# Patient Record
Sex: MALE | Race: WHITE | NOT HISPANIC OR LATINO | Employment: FULL TIME | URBAN - METROPOLITAN AREA
[De-identification: names, ages, dates, MRNs, and addresses within clinical notes are randomized per-mention and may not be internally consistent; named-entity substitution may affect disease eponyms.]

---

## 2023-03-01 LAB
AMPHETAMINE (PRESENCE) IN URINE BY SCREEN METHOD: ABNORMAL
BARBITURATES PRESENCE IN URINE BY SCREEN METHOD: ABNORMAL
BENZODIAZEPINE (PRESENCE) IN URINE BY SCREEN METHOD: ABNORMAL
CANNABINOIDS IN URINE BY SCREEN METHOD: ABNORMAL
COCAINE (PRESENCE) IN URINE BY SCREEN METHOD: ABNORMAL
DRUG SCREEN COMMENT URINE: ABNORMAL
FENTANYL URINE: ABNORMAL
METHADONE (PRESENCE) IN URINE BY SCREEN METHOD: ABNORMAL
OPIATES (PRESENCE) IN URINE BY SCREEN METHOD: ABNORMAL
OXYCODONE (PRESENCE) IN URINE BY SCREEN METHOD: ABNORMAL
PHENCYCLIDINE (PRESENCE) IN URINE BY SCREEN METHOD: ABNORMAL

## 2023-03-05 LAB
AMPHETAMINES,URINE: >5000 NG/ML
MDA,URINE: <200 NG/ML
MDEA,URINE: <200 NG/ML
MDMA,UR: <200 NG/ML
METHAMPHETAMINE QUANTITATIVE URINE: <200 NG/ML
PHENTERMINE,UR: <200 NG/ML

## 2023-03-14 DIAGNOSIS — F90.1 ATTENTION DEFICIT HYPERACTIVITY DISORDER (ADHD), PREDOMINANTLY HYPERACTIVE TYPE: Primary | ICD-10-CM

## 2023-03-14 RX ORDER — DEXTROAMPHETAMINE SACCHARATE, AMPHETAMINE ASPARTATE MONOHYDRATE, DEXTROAMPHETAMINE SULFATE AND AMPHETAMINE SULFATE 7.5; 7.5; 7.5; 7.5 MG/1; MG/1; MG/1; MG/1
30 CAPSULE, EXTENDED RELEASE ORAL
COMMUNITY
Start: 2020-03-12 | End: 2023-03-14 | Stop reason: SDUPTHER

## 2023-03-14 RX ORDER — DEXTROAMPHETAMINE SACCHARATE, AMPHETAMINE ASPARTATE MONOHYDRATE, DEXTROAMPHETAMINE SULFATE AND AMPHETAMINE SULFATE 7.5; 7.5; 7.5; 7.5 MG/1; MG/1; MG/1; MG/1
30 CAPSULE, EXTENDED RELEASE ORAL
Qty: 90 CAPSULE | Refills: 0 | Status: SHIPPED | OUTPATIENT
Start: 2023-03-14 | End: 2023-03-15 | Stop reason: SDUPTHER

## 2023-03-15 DIAGNOSIS — F90.1 ATTENTION DEFICIT HYPERACTIVITY DISORDER (ADHD), PREDOMINANTLY HYPERACTIVE TYPE: ICD-10-CM

## 2023-03-15 RX ORDER — DEXTROAMPHETAMINE SACCHARATE, AMPHETAMINE ASPARTATE MONOHYDRATE, DEXTROAMPHETAMINE SULFATE AND AMPHETAMINE SULFATE 7.5; 7.5; 7.5; 7.5 MG/1; MG/1; MG/1; MG/1
30 CAPSULE, EXTENDED RELEASE ORAL
Qty: 90 CAPSULE | Refills: 0 | Status: SHIPPED | OUTPATIENT
Start: 2023-03-15 | End: 2023-04-24 | Stop reason: SDUPTHER

## 2023-03-16 DIAGNOSIS — F90.1 ATTENTION DEFICIT HYPERACTIVITY DISORDER (ADHD), PREDOMINANTLY HYPERACTIVE TYPE: Primary | ICD-10-CM

## 2023-03-30 RX ORDER — DEXTROAMPHETAMINE SACCHARATE, AMPHETAMINE ASPARTATE MONOHYDRATE, DEXTROAMPHETAMINE SULFATE AND AMPHETAMINE SULFATE 7.5; 7.5; 7.5; 7.5 MG/1; MG/1; MG/1; MG/1
30 CAPSULE, EXTENDED RELEASE ORAL
Qty: 30 CAPSULE | Refills: 0 | OUTPATIENT
Start: 2023-03-30 | End: 2023-04-29

## 2023-04-24 DIAGNOSIS — F90.1 ATTENTION DEFICIT HYPERACTIVITY DISORDER (ADHD), PREDOMINANTLY HYPERACTIVE TYPE: ICD-10-CM

## 2023-04-24 RX ORDER — DEXTROAMPHETAMINE SACCHARATE, AMPHETAMINE ASPARTATE MONOHYDRATE, DEXTROAMPHETAMINE SULFATE AND AMPHETAMINE SULFATE 7.5; 7.5; 7.5; 7.5 MG/1; MG/1; MG/1; MG/1
30 CAPSULE, EXTENDED RELEASE ORAL
Qty: 90 CAPSULE | Refills: 0 | Status: SHIPPED | OUTPATIENT
Start: 2023-04-24 | End: 2023-05-30 | Stop reason: SDUPTHER

## 2023-05-25 RX ORDER — CALCIUM CARBONATE/VITAMIN D3 600 MG-10
TABLET ORAL
COMMUNITY
End: 2023-05-30 | Stop reason: ALTCHOICE

## 2023-05-25 RX ORDER — MULTIVITAMIN
TABLET ORAL
COMMUNITY

## 2023-05-25 RX ORDER — TAMSULOSIN HYDROCHLORIDE 0.4 MG/1
1 CAPSULE ORAL DAILY
COMMUNITY
Start: 2022-11-23 | End: 2023-05-30 | Stop reason: ALTCHOICE

## 2023-05-25 RX ORDER — ONDANSETRON 4 MG/1
1 TABLET, ORALLY DISINTEGRATING ORAL
COMMUNITY
Start: 2022-11-23 | End: 2023-05-30 | Stop reason: ALTCHOICE

## 2023-05-25 RX ORDER — CHOLECALCIFEROL (VITAMIN D3) 125 MCG
CAPSULE ORAL
COMMUNITY
End: 2023-05-30 | Stop reason: ALTCHOICE

## 2023-05-25 RX ORDER — MAGNESIUM 250 MG
1 TABLET ORAL DAILY
COMMUNITY
End: 2023-05-30 | Stop reason: ALTCHOICE

## 2023-05-25 RX ORDER — DIAPER,BRIEF,ADULT, DISPOSABLE
EACH MISCELLANEOUS
COMMUNITY
End: 2023-05-30 | Stop reason: ALTCHOICE

## 2023-05-30 ENCOUNTER — OFFICE VISIT (OUTPATIENT)
Dept: PRIMARY CARE | Facility: CLINIC | Age: 33
End: 2023-05-30
Payer: COMMERCIAL

## 2023-05-30 VITALS
DIASTOLIC BLOOD PRESSURE: 76 MMHG | OXYGEN SATURATION: 98 % | HEIGHT: 71 IN | HEART RATE: 80 BPM | BODY MASS INDEX: 29.68 KG/M2 | RESPIRATION RATE: 16 BRPM | WEIGHT: 212 LBS | TEMPERATURE: 98 F | SYSTOLIC BLOOD PRESSURE: 124 MMHG

## 2023-05-30 DIAGNOSIS — Z86.69 HISTORY OF IRITIS: ICD-10-CM

## 2023-05-30 DIAGNOSIS — B36.0 TINEA VERSICOLOR: ICD-10-CM

## 2023-05-30 DIAGNOSIS — N28.89: ICD-10-CM

## 2023-05-30 DIAGNOSIS — Z15.89 HLA B27 (HLA B27 POSITIVE): ICD-10-CM

## 2023-05-30 DIAGNOSIS — E78.5 DYSLIPIDEMIA: ICD-10-CM

## 2023-05-30 DIAGNOSIS — Z79.899 HIGH RISK MEDICATION USE: ICD-10-CM

## 2023-05-30 DIAGNOSIS — R53.83 OTHER FATIGUE: ICD-10-CM

## 2023-05-30 DIAGNOSIS — D68.51 FACTOR 5 LEIDEN MUTATION, HETEROZYGOUS (MULTI): ICD-10-CM

## 2023-05-30 DIAGNOSIS — F90.1 ATTENTION DEFICIT HYPERACTIVITY DISORDER (ADHD), PREDOMINANTLY HYPERACTIVE TYPE: Primary | ICD-10-CM

## 2023-05-30 PROCEDURE — 99214 OFFICE O/P EST MOD 30 MIN: CPT | Performed by: FAMILY MEDICINE

## 2023-05-30 PROCEDURE — 85027 COMPLETE CBC AUTOMATED: CPT

## 2023-05-30 PROCEDURE — 80053 COMPREHEN METABOLIC PANEL: CPT

## 2023-05-30 PROCEDURE — 1036F TOBACCO NON-USER: CPT | Performed by: FAMILY MEDICINE

## 2023-05-30 PROCEDURE — 80061 LIPID PANEL: CPT

## 2023-05-30 PROCEDURE — 84443 ASSAY THYROID STIM HORMONE: CPT

## 2023-05-30 RX ORDER — DEXTROAMPHETAMINE SACCHARATE, AMPHETAMINE ASPARTATE MONOHYDRATE, DEXTROAMPHETAMINE SULFATE AND AMPHETAMINE SULFATE 7.5; 7.5; 7.5; 7.5 MG/1; MG/1; MG/1; MG/1
30 CAPSULE, EXTENDED RELEASE ORAL
Qty: 90 CAPSULE | Refills: 0 | Status: SHIPPED | OUTPATIENT
Start: 2023-05-30 | End: 2023-07-12 | Stop reason: SDUPTHER

## 2023-05-30 ASSESSMENT — PAIN SCALES - GENERAL: PAINLEVEL: 0-NO PAIN

## 2023-05-30 NOTE — PROGRESS NOTES
Subjective   Raghu Khan is a 32 y.o. male who presents for Follow-up    HPI  : Patient is a 32-year-old male who is in today for recheck on blood work and also his Adderall refill.  He has had ADHD since college and has been doing well with Adderall XR 30 mg daily.  Patient also needs a refill on his tenia versicolor skin lotion, Ketoconazole.  Patient and his wife are still struggling with infertility and his wife has lost about 30 pounds dieting and hopefully this will make a difference.  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      Objective :  ROS  :10 systems were reviewed and the information is included in the HPI and no additional review of systems is indicated.    Physical Exam  Vitals and nursing note reviewed.   Constitutional:       Appearance: Normal appearance. He is normal weight.   HENT:      Head: Normocephalic.      Right Ear: Tympanic membrane and ear canal normal.      Left Ear: Tympanic membrane and ear canal normal.      Nose: Nose normal.      Mouth/Throat:      Mouth: Mucous membranes are moist.      Pharynx: Oropharynx is clear.      Comments: Mouth is moist, tongue is midline, no posterior pharyngeal erythema.  Eyes:      Extraocular Movements: Extraocular movements intact.      Conjunctiva/sclera: Conjunctivae normal.      Pupils: Pupils are equal, round, and reactive to light.      Comments: Patient denies any blurred or double vision.  Does have a yearly eye exam.   Neck:      Comments: Denies cervical restriction of motion or spasm.  Cardiovascular:      Rate and Rhythm: Normal rate and regular rhythm.      Heart sounds: Normal heart sounds.      Comments: Heart rhythm is stable S1 and S2 are noted.  No ectopics.  Patient denies chest pain or palpitations.  No murmurs.  Pulmonary:      Effort: Pulmonary effort is normal.      Breath sounds: Normal breath sounds.      Comments: Lungs are clear to  auscultation.  No coughing or wheezing noted.  Abdominal:      General: Abdomen is flat. Bowel sounds are normal.      Palpations: Abdomen is soft.      Comments: Patient denies any abdominal tenderness, no guarding or rebound tenderness.  Denies any flank tenderness.   Genitourinary:     Comments: Denies dysuria, nocturia or hematuria.  Patient denies any flank tenderness.   Musculoskeletal:      Cervical back: Normal range of motion.      Comments: Denies significant arthritis in his joints.  Ambulates without difficulty.  Coordination and gait are normal.   Skin:     Comments: Patient does have tenia versicolor rash on his torso and was prescribed ketoconazole.  He has used it before.   Neurological:      General: No focal deficit present.      Mental Status: He is alert and oriented to person, place, and time. Mental status is at baseline.      Comments: Patient denies any neurosensory deficits or muscle weakness.  No peripheral neuropathy.  Patient does take Adderall for ADHD and has been on the medication since college.  It helps him to concentrate and stay more focused.   Psychiatric:         Mood and Affect: Mood normal.         Behavior: Behavior normal.         Thought Content: Thought content normal.         Judgment: Judgment normal.      Comments: Normal mood and affect.  He does have ADHD and sometimes stutters.  He does talk rapid.  Normal thought and judgment.     PLAN :  Patient is a 32-year-old male who is evaluated today for blood work and refill of his medication.  Patient has attention deficit hyperactivity disorder and has had this issue since college.  He had seen a psychologist at that time and was placed on Adderall and has been doing well ever since.  He did sign the E consent form and I have reviewed his OARRS report.  Patient also has a history of HLA-B27 positive with iritis.  He also has factor V Leiden mutation which has not caused him any problems.  Patient was given a prescription  for his Adderall and he will fill it at his local pharmacy since there has been problems with finding the medication locally.  Patient otherwise will be notified of his blood results and 4 days and further recommendations will be made at that time.    Problem List Items Addressed This Visit    None  Visit Diagnoses       Tinea versicolor    -  Primary    Relevant Medications    ketoconazole 1 % shampoo    Attention deficit hyperactivity disorder (ADHD), predominantly hyperactive type        Relevant Medications    amphetamine-dextroamphetamine XR (Adderall XR) 30 mg 24 hr capsule    High risk medication use        Relevant Orders    Comprehensive Metabolic Panel    Dyslipidemia        Relevant Orders    Lipid Panel    Other fatigue        Relevant Orders    CBC    Thyroid Stimulating Hormone                 Paco Parrish DO

## 2023-05-31 LAB
ALANINE AMINOTRANSFERASE (SGPT) (U/L) IN SER/PLAS: 14 U/L (ref 10–52)
ALBUMIN (G/DL) IN SER/PLAS: 4.6 G/DL (ref 3.4–5)
ALKALINE PHOSPHATASE (U/L) IN SER/PLAS: 62 U/L (ref 33–120)
ANION GAP IN SER/PLAS: 12 MMOL/L (ref 10–20)
ASPARTATE AMINOTRANSFERASE (SGOT) (U/L) IN SER/PLAS: 20 U/L (ref 9–39)
BILIRUBIN TOTAL (MG/DL) IN SER/PLAS: 1.1 MG/DL (ref 0–1.2)
CALCIUM (MG/DL) IN SER/PLAS: 10.2 MG/DL (ref 8.6–10.6)
CARBON DIOXIDE, TOTAL (MMOL/L) IN SER/PLAS: 32 MMOL/L (ref 21–32)
CHLORIDE (MMOL/L) IN SER/PLAS: 101 MMOL/L (ref 98–107)
CHOLESTEROL (MG/DL) IN SER/PLAS: 153 MG/DL (ref 0–199)
CHOLESTEROL IN HDL (MG/DL) IN SER/PLAS: 40.7 MG/DL
CHOLESTEROL/HDL RATIO: 3.8
CREATININE (MG/DL) IN SER/PLAS: 0.83 MG/DL (ref 0.5–1.3)
ERYTHROCYTE DISTRIBUTION WIDTH (RATIO) BY AUTOMATED COUNT: 12.6 % (ref 11.5–14.5)
ERYTHROCYTE MEAN CORPUSCULAR HEMOGLOBIN CONCENTRATION (G/DL) BY AUTOMATED: 31.1 G/DL (ref 32–36)
ERYTHROCYTE MEAN CORPUSCULAR VOLUME (FL) BY AUTOMATED COUNT: 99 FL (ref 80–100)
ERYTHROCYTES (10*6/UL) IN BLOOD BY AUTOMATED COUNT: 4.9 X10E12/L (ref 4.5–5.9)
GFR MALE: >90 ML/MIN/1.73M2
GLUCOSE (MG/DL) IN SER/PLAS: 76 MG/DL (ref 74–99)
HEMATOCRIT (%) IN BLOOD BY AUTOMATED COUNT: 48.5 % (ref 41–52)
HEMOGLOBIN (G/DL) IN BLOOD: 15.1 G/DL (ref 13.5–17.5)
LDL: 95 MG/DL (ref 0–99)
LEUKOCYTES (10*3/UL) IN BLOOD BY AUTOMATED COUNT: 7.2 X10E9/L (ref 4.4–11.3)
NRBC (PER 100 WBCS) BY AUTOMATED COUNT: 0 /100 WBC (ref 0–0)
PLATELETS (10*3/UL) IN BLOOD AUTOMATED COUNT: 188 X10E9/L (ref 150–450)
POTASSIUM (MMOL/L) IN SER/PLAS: 3.9 MMOL/L (ref 3.5–5.3)
PROTEIN TOTAL: 7.7 G/DL (ref 6.4–8.2)
SODIUM (MMOL/L) IN SER/PLAS: 141 MMOL/L (ref 136–145)
THYROTROPIN (MIU/L) IN SER/PLAS BY DETECTION LIMIT <= 0.05 MIU/L: 1.69 MIU/L (ref 0.44–3.98)
TRIGLYCERIDE (MG/DL) IN SER/PLAS: 89 MG/DL (ref 0–149)
UREA NITROGEN (MG/DL) IN SER/PLAS: 13 MG/DL (ref 6–23)
VLDL: 18 MG/DL (ref 0–40)

## 2023-06-02 NOTE — RESULT ENCOUNTER NOTE
Red and white blood cell counts are normal    platelet count is normal     sugar,    kidney and liver function are normal         cholesterol is normal at 153     triglycerides are normal at 89        thyroid function is normal          blood work all looks very good keep up the good work

## 2023-07-12 DIAGNOSIS — F90.1 ATTENTION DEFICIT HYPERACTIVITY DISORDER (ADHD), PREDOMINANTLY HYPERACTIVE TYPE: ICD-10-CM

## 2023-07-12 RX ORDER — DEXTROAMPHETAMINE SACCHARATE, AMPHETAMINE ASPARTATE MONOHYDRATE, DEXTROAMPHETAMINE SULFATE AND AMPHETAMINE SULFATE 7.5; 7.5; 7.5; 7.5 MG/1; MG/1; MG/1; MG/1
30 CAPSULE, EXTENDED RELEASE ORAL
Qty: 90 CAPSULE | Refills: 0 | Status: SHIPPED | OUTPATIENT
Start: 2023-07-12 | End: 2023-07-13 | Stop reason: SDUPTHER

## 2023-07-13 DIAGNOSIS — F90.1 ATTENTION DEFICIT HYPERACTIVITY DISORDER (ADHD), PREDOMINANTLY HYPERACTIVE TYPE: ICD-10-CM

## 2023-07-13 RX ORDER — DEXTROAMPHETAMINE SACCHARATE, AMPHETAMINE ASPARTATE MONOHYDRATE, DEXTROAMPHETAMINE SULFATE AND AMPHETAMINE SULFATE 7.5; 7.5; 7.5; 7.5 MG/1; MG/1; MG/1; MG/1
30 CAPSULE, EXTENDED RELEASE ORAL
Qty: 30 CAPSULE | Refills: 0 | Status: SHIPPED | OUTPATIENT
Start: 2023-07-13 | End: 2023-08-22 | Stop reason: SDUPTHER

## 2023-08-22 ENCOUNTER — TELEPHONE (OUTPATIENT)
Dept: PRIMARY CARE | Facility: CLINIC | Age: 33
End: 2023-08-22
Payer: COMMERCIAL

## 2023-08-22 DIAGNOSIS — F90.1 ATTENTION DEFICIT HYPERACTIVITY DISORDER (ADHD), PREDOMINANTLY HYPERACTIVE TYPE: ICD-10-CM

## 2023-08-22 RX ORDER — DEXTROAMPHETAMINE SACCHARATE, AMPHETAMINE ASPARTATE MONOHYDRATE, DEXTROAMPHETAMINE SULFATE AND AMPHETAMINE SULFATE 7.5; 7.5; 7.5; 7.5 MG/1; MG/1; MG/1; MG/1
30 CAPSULE, EXTENDED RELEASE ORAL
Qty: 30 CAPSULE | Refills: 0 | Status: SHIPPED | OUTPATIENT
Start: 2023-08-22 | End: 2023-09-14 | Stop reason: SDUPTHER

## 2023-08-22 NOTE — TELEPHONE ENCOUNTER
Patient called and left a voice message on saul answering machine stating he wants a paper script of his generic adderall so he can take it to his pharmacy or find a pharmacy that has his prescription in stock, I called and informed patient that Dr. Antonio is not in the office this week to call around to different pharmacy's to see who has this prescription in stock then call the office back to let us know so we can get this filled for him.

## 2023-09-06 ENCOUNTER — APPOINTMENT (OUTPATIENT)
Dept: PRIMARY CARE | Facility: CLINIC | Age: 33
End: 2023-09-06
Payer: COMMERCIAL

## 2023-09-14 ENCOUNTER — OFFICE VISIT (OUTPATIENT)
Dept: PRIMARY CARE | Facility: CLINIC | Age: 33
End: 2023-09-14
Payer: COMMERCIAL

## 2023-09-14 VITALS
SYSTOLIC BLOOD PRESSURE: 128 MMHG | WEIGHT: 208 LBS | RESPIRATION RATE: 16 BRPM | HEART RATE: 78 BPM | TEMPERATURE: 98 F | DIASTOLIC BLOOD PRESSURE: 72 MMHG | BODY MASS INDEX: 29.12 KG/M2 | HEIGHT: 71 IN | OXYGEN SATURATION: 98 %

## 2023-09-14 DIAGNOSIS — Z56.6 STRESS AT WORK: Primary | ICD-10-CM

## 2023-09-14 DIAGNOSIS — F90.1 ATTENTION DEFICIT HYPERACTIVITY DISORDER (ADHD), PREDOMINANTLY HYPERACTIVE TYPE: ICD-10-CM

## 2023-09-14 DIAGNOSIS — Z79.899 HIGH RISK MEDICATION USE: ICD-10-CM

## 2023-09-14 DIAGNOSIS — D68.51 FACTOR 5 LEIDEN MUTATION, HETEROZYGOUS (MULTI): ICD-10-CM

## 2023-09-14 LAB
AMPHETAMINE (PRESENCE) IN URINE BY SCREEN METHOD: ABNORMAL
BARBITURATES PRESENCE IN URINE BY SCREEN METHOD: ABNORMAL
BENZODIAZEPINE (PRESENCE) IN URINE BY SCREEN METHOD: ABNORMAL
CANNABINOIDS IN URINE BY SCREEN METHOD: ABNORMAL
COCAINE (PRESENCE) IN URINE BY SCREEN METHOD: ABNORMAL
DRUG SCREEN COMMENT URINE: ABNORMAL
FENTANYL URINE: ABNORMAL
OPIATES (PRESENCE) IN URINE BY SCREEN METHOD: ABNORMAL
OXYCODONE (PRESENCE) IN URINE BY SCREEN METHOD: ABNORMAL
PHENCYCLIDINE (PRESENCE) IN URINE BY SCREEN METHOD: ABNORMAL

## 2023-09-14 PROCEDURE — 80324 DRUG SCREEN AMPHETAMINES 1/2: CPT

## 2023-09-14 PROCEDURE — 1036F TOBACCO NON-USER: CPT | Performed by: FAMILY MEDICINE

## 2023-09-14 PROCEDURE — 80307 DRUG TEST PRSMV CHEM ANLYZR: CPT

## 2023-09-14 PROCEDURE — 99214 OFFICE O/P EST MOD 30 MIN: CPT | Performed by: FAMILY MEDICINE

## 2023-09-14 RX ORDER — CHOLECALCIFEROL (VITAMIN D3) 125 MCG
CAPSULE ORAL
COMMUNITY

## 2023-09-14 RX ORDER — DEXTROAMPHETAMINE SACCHARATE, AMPHETAMINE ASPARTATE MONOHYDRATE, DEXTROAMPHETAMINE SULFATE AND AMPHETAMINE SULFATE 7.5; 7.5; 7.5; 7.5 MG/1; MG/1; MG/1; MG/1
30 CAPSULE, EXTENDED RELEASE ORAL
Qty: 90 CAPSULE | Refills: 0 | Status: SHIPPED | OUTPATIENT
Start: 2023-09-14 | End: 2023-10-31 | Stop reason: SDUPTHER

## 2023-09-14 SDOH — HEALTH STABILITY - MENTAL HEALTH: OTHER PHYSICAL AND MENTAL STRAIN RELATED TO WORK: Z56.6

## 2023-09-14 ASSESSMENT — PAIN SCALES - GENERAL: PAINLEVEL: 0-NO PAIN

## 2023-09-14 NOTE — PROGRESS NOTES
Subjective   Raghu Khan is a 33 y.o. male who presents for Follow-up (Follow up visit, medication refills needed today).    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  He has had these problems with concentration and focus since college and has been treated with stimulant medication since that time.  I will review his OARRS report today and he will give us a urine for drug screen.  Patient also takes trazodone at bedtime to help him sleep.  He otherwise is stable on feels well and he is hoping that generic Vyvanse comes out soon so he can get the cheaper medication.      Objective  : ROS 10 systems were reviewed and the information is included in the HPI and no additional review of systems is indicated.    Physical Exam  Vitals and nursing note reviewed.   Constitutional:       Appearance: Normal appearance. He is normal weight.      Comments: Patient is alert and oriented x3.   No acute distress'   HENT:      Head: Normocephalic.      Right Ear: Tympanic membrane and external ear normal.      Left Ear: Tympanic membrane and external ear normal.      Ears:      Comments: Ears are patent bilaterally and TMs are clear.     Nose: Nose normal.      Mouth/Throat:      Mouth: Mucous membranes are moist.      Pharynx: Oropharynx is clear.      Comments: Mouth is moist, tongue is midline.  No posterior pharyngeal erythema.  Eyes:      Extraocular Movements: Extraocular movements intact.      Conjunctiva/sclera: Conjunctivae normal.      Pupils: Pupils are equal, round, and reactive to light.      Comments: No visual disturbance, does have his  eyes examined once a year.   Neck:      Comments: No carotid bruits, no thyromegaly, no cervical adenopathy.  Occasional neck spasm and restriction of motion secondary to stress and tension.  Cardiovascular:      Rate and Rhythm: Normal rate and regular rhythm.      Pulses: Normal pulses.       Heart sounds: Normal heart sounds.      Comments: Patient denies chest pain and no palpitations.  Heart rhythm is stable S1 and S2 are noted, no ectopics.  Pulmonary:      Effort: Pulmonary effort is normal.      Breath sounds: Normal breath sounds.      Comments: Patient denies any coughing or wheezing.  Lungs are clear to auscultation.    Abdominal:      General: Abdomen is flat. Bowel sounds are normal.      Palpations: Abdomen is soft.      Comments: Abdomen is soft and nontender, no hepatosplenomegaly.  No flank tenderness.  No suprapubic pain.  Positive bowel sounds x4.  No abdominal guarding and no rebound tenderness.   Genitourinary:     Comments: Patient denies dysuria, no hematuria, no nocturia, denies flank pain.  Musculoskeletal:         General: Normal range of motion.      Cervical back: Normal range of motion.      Comments: Age-related arthritis in the joints.  Mild restriction of motion cervical and lumbar spines due to muscle spasm.   Skin:     General: Skin is warm.      Comments: There is no bruising, no erythema, no skin lesions noted, no rashes.   Neurological:      General: No focal deficit present.      Mental Status: He is alert and oriented to person, place, and time. Mental status is at baseline.      Comments: ADHD well controlled with  Adderall.   No focal neurosensory deficits are noted.  Patient denies any peripheral neuropathy.  Coordination and gait are stable.  Normal muscle strength upper and lower extremities.  Concentration and focus better.   Psychiatric:         Mood and Affect: Mood normal.         Behavior: Behavior normal.         Thought Content: Thought content normal.         Judgment: Judgment normal.      Comments: Patient is very talkative and outgoing  and has normal mood and affect.  Thought content and judgment are stable.  No signs of vascular dementia.  Behavior is normal.     PLAN : Patient is a 33-year-old male who has ADHD since college.  He was initially on  Vyvanse but then it became too expensive so he is now on generic Adderall.  I did review his OARRS report and he did give us a urine for drug screen.  Patient will try to obtain 90-day prescription with his insurance since it is more economical.  Vyvanse is supposed to be coming out with the generic medication soon so once that does come out he will want to switch and try it.  Patient also takes trazodone at bedtime which helps him sleep in the evening and also helps his depression at night.  Patient also has Leiden factor V disorder ,heterozygous.  Patient is currently stable and does see hematology once a year for that blood work.  Patient will follow-up as needed probably in 3 to 4 months or sooner if required.    Problem List Items Addressed This Visit       High risk medication use    Attention deficit hyperactivity disorder (ADHD), predominantly hyperactive type    Relevant Medications    amphetamine-dextroamphetamine XR (Adderall XR) 30 mg 24 hr capsule    Factor 5 Leiden mutation, heterozygous (CMS/HCC)     Other Visit Diagnoses       Stress at work    -  Primary                 Paco Parrish, DO

## 2023-10-31 DIAGNOSIS — F90.1 ATTENTION DEFICIT HYPERACTIVITY DISORDER (ADHD), PREDOMINANTLY HYPERACTIVE TYPE: ICD-10-CM

## 2023-11-01 RX ORDER — DEXTROAMPHETAMINE SACCHARATE, AMPHETAMINE ASPARTATE MONOHYDRATE, DEXTROAMPHETAMINE SULFATE AND AMPHETAMINE SULFATE 7.5; 7.5; 7.5; 7.5 MG/1; MG/1; MG/1; MG/1
30 CAPSULE, EXTENDED RELEASE ORAL
Qty: 90 CAPSULE | Refills: 0 | Status: SHIPPED | OUTPATIENT
Start: 2023-11-01 | End: 2023-11-06 | Stop reason: SDUPTHER

## 2023-11-06 DIAGNOSIS — F90.1 ATTENTION DEFICIT HYPERACTIVITY DISORDER (ADHD), PREDOMINANTLY HYPERACTIVE TYPE: ICD-10-CM

## 2023-11-06 RX ORDER — DEXTROAMPHETAMINE SACCHARATE, AMPHETAMINE ASPARTATE MONOHYDRATE, DEXTROAMPHETAMINE SULFATE AND AMPHETAMINE SULFATE 7.5; 7.5; 7.5; 7.5 MG/1; MG/1; MG/1; MG/1
30 CAPSULE, EXTENDED RELEASE ORAL
Qty: 90 CAPSULE | Refills: 0 | Status: SHIPPED | OUTPATIENT
Start: 2023-11-06 | End: 2023-12-14 | Stop reason: SDUPTHER

## 2023-12-14 ENCOUNTER — OFFICE VISIT (OUTPATIENT)
Dept: PRIMARY CARE | Facility: CLINIC | Age: 33
End: 2023-12-14
Payer: COMMERCIAL

## 2023-12-14 VITALS
TEMPERATURE: 97.9 F | RESPIRATION RATE: 16 BRPM | BODY MASS INDEX: 29.96 KG/M2 | DIASTOLIC BLOOD PRESSURE: 74 MMHG | SYSTOLIC BLOOD PRESSURE: 124 MMHG | HEART RATE: 71 BPM | HEIGHT: 71 IN | OXYGEN SATURATION: 98 % | WEIGHT: 214 LBS

## 2023-12-14 DIAGNOSIS — Z79.899 HIGH RISK MEDICATION USE: ICD-10-CM

## 2023-12-14 DIAGNOSIS — E55.9 VITAMIN D DEFICIENCY: ICD-10-CM

## 2023-12-14 DIAGNOSIS — E78.5 DYSLIPIDEMIA: ICD-10-CM

## 2023-12-14 DIAGNOSIS — D68.51 FACTOR 5 LEIDEN MUTATION, HETEROZYGOUS (MULTI): ICD-10-CM

## 2023-12-14 DIAGNOSIS — R53.83 OTHER FATIGUE: ICD-10-CM

## 2023-12-14 DIAGNOSIS — F90.1 ATTENTION DEFICIT HYPERACTIVITY DISORDER (ADHD), PREDOMINANTLY HYPERACTIVE TYPE: Primary | ICD-10-CM

## 2023-12-14 LAB
25(OH)D3 SERPL-MCNC: 37 NG/ML (ref 30–100)
ALBUMIN SERPL BCP-MCNC: 4.8 G/DL (ref 3.4–5)
ALP SERPL-CCNC: 70 U/L (ref 33–120)
ALT SERPL W P-5'-P-CCNC: 20 U/L (ref 10–52)
ANION GAP SERPL CALC-SCNC: 14 MMOL/L (ref 10–20)
AST SERPL W P-5'-P-CCNC: 21 U/L (ref 9–39)
BILIRUB SERPL-MCNC: 0.8 MG/DL (ref 0–1.2)
BUN SERPL-MCNC: 14 MG/DL (ref 6–23)
CALCIUM SERPL-MCNC: 10 MG/DL (ref 8.6–10.6)
CHLORIDE SERPL-SCNC: 102 MMOL/L (ref 98–107)
CHOLEST SERPL-MCNC: 164 MG/DL (ref 0–199)
CHOLESTEROL/HDL RATIO: 3.6
CO2 SERPL-SCNC: 31 MMOL/L (ref 21–32)
CREAT SERPL-MCNC: 0.89 MG/DL (ref 0.5–1.3)
ERYTHROCYTE [DISTWIDTH] IN BLOOD BY AUTOMATED COUNT: 11.9 % (ref 11.5–14.5)
GFR SERPL CREATININE-BSD FRML MDRD: >90 ML/MIN/1.73M*2
GLUCOSE SERPL-MCNC: 82 MG/DL (ref 74–99)
HCT VFR BLD AUTO: 46.9 % (ref 41–52)
HDLC SERPL-MCNC: 45.5 MG/DL
HGB BLD-MCNC: 15.7 G/DL (ref 13.5–17.5)
LDLC SERPL CALC-MCNC: 98 MG/DL
MCH RBC QN AUTO: 30.8 PG (ref 26–34)
MCHC RBC AUTO-ENTMCNC: 33.5 G/DL (ref 32–36)
MCV RBC AUTO: 92 FL (ref 80–100)
NON HDL CHOLESTEROL: 119 MG/DL (ref 0–149)
NRBC BLD-RTO: 0 /100 WBCS (ref 0–0)
PLATELET # BLD AUTO: 191 X10*3/UL (ref 150–450)
POTASSIUM SERPL-SCNC: 3.8 MMOL/L (ref 3.5–5.3)
PROT SERPL-MCNC: 7.8 G/DL (ref 6.4–8.2)
RBC # BLD AUTO: 5.09 X10*6/UL (ref 4.5–5.9)
SODIUM SERPL-SCNC: 143 MMOL/L (ref 136–145)
TRIGL SERPL-MCNC: 103 MG/DL (ref 0–149)
TSH SERPL-ACNC: 1.51 MIU/L (ref 0.44–3.98)
VLDL: 21 MG/DL (ref 0–40)
WBC # BLD AUTO: 7.1 X10*3/UL (ref 4.4–11.3)

## 2023-12-14 PROCEDURE — 84443 ASSAY THYROID STIM HORMONE: CPT

## 2023-12-14 PROCEDURE — 82306 VITAMIN D 25 HYDROXY: CPT

## 2023-12-14 PROCEDURE — 80053 COMPREHEN METABOLIC PANEL: CPT

## 2023-12-14 PROCEDURE — 99214 OFFICE O/P EST MOD 30 MIN: CPT | Performed by: FAMILY MEDICINE

## 2023-12-14 PROCEDURE — 1036F TOBACCO NON-USER: CPT | Performed by: FAMILY MEDICINE

## 2023-12-14 PROCEDURE — 36415 COLL VENOUS BLD VENIPUNCTURE: CPT

## 2023-12-14 PROCEDURE — 85027 COMPLETE CBC AUTOMATED: CPT

## 2023-12-14 PROCEDURE — 80061 LIPID PANEL: CPT

## 2023-12-14 RX ORDER — DEXTROAMPHETAMINE SACCHARATE, AMPHETAMINE ASPARTATE MONOHYDRATE, DEXTROAMPHETAMINE SULFATE AND AMPHETAMINE SULFATE 7.5; 7.5; 7.5; 7.5 MG/1; MG/1; MG/1; MG/1
30 CAPSULE, EXTENDED RELEASE ORAL
Qty: 90 CAPSULE | Refills: 0 | Status: SHIPPED
Start: 2023-12-14 | End: 2024-01-23 | Stop reason: SDUPTHER

## 2023-12-14 ASSESSMENT — PATIENT HEALTH QUESTIONNAIRE - PHQ9
1. LITTLE INTEREST OR PLEASURE IN DOING THINGS: NOT AT ALL
SUM OF ALL RESPONSES TO PHQ9 QUESTIONS 1 AND 2: 0
2. FEELING DOWN, DEPRESSED OR HOPELESS: NOT AT ALL

## 2023-12-14 ASSESSMENT — PAIN SCALES - GENERAL: PAINLEVEL: 0-NO PAIN

## 2023-12-14 NOTE — PROGRESS NOTES
Subjective   Raghu Khan is a 33 y.o. male who presents for Follow-up (Follow up visit, blood pressure check and blood work today).    HPI  : Patient is a 33-year-old male who is in for recheck on blood pressure, blood work, and refill on his ADHD medicine.  He is now able to get the Adderall through mail order and we will attempt to send the prescription to his mail order pharmacy.  There have been issues at the local pharmacies and trying to get Adderall but hopefully the mail order will have it.  Patient has no new problems and has been on Adderall for many years.  He also takes trazodone at bedtime which does help him sleep also helps with the anxious depression.      Objective  : ROS :10 systems were reviewed and the information is included in the HPI and no additional review of systems is indicated.    Physical Exam  Vitals and nursing note reviewed.   Constitutional:       Appearance: Normal appearance. He is normal weight.      Comments: Patient is alert and oriented x3.   No acute distress   HENT:      Head: Normocephalic.      Right Ear: Tympanic membrane and external ear normal.      Left Ear: Tympanic membrane and external ear normal.      Ears:      Comments: Ears are patent bilaterally and TMs are clear.     Nose: Nose normal.      Mouth/Throat:      Mouth: Mucous membranes are moist.      Pharynx: Oropharynx is clear.      Comments: Mouth is moist, tongue is midline.  No posterior pharyngeal erythema.  Eyes:      Extraocular Movements: Extraocular movements intact.      Conjunctiva/sclera: Conjunctivae normal.      Pupils: Pupils are equal, round, and reactive to light.      Comments: Patient denies any visual problems and does have a yearly eye exam.   Neck:      Comments: No carotid bruits, no thyromegaly, no cervical adenopathy.  Occasional neck spasm and restriction of motion secondary to stress and tension.  Cardiovascular:      Rate and Rhythm: Normal rate and regular rhythm.       Pulses: Normal pulses.      Heart sounds: Normal heart sounds.      Comments: Patient denies chest pain and no palpitations.  Heart rhythm is stable S1 and S2 are noted, no ectopics.  Pulmonary:      Effort: Pulmonary effort is normal.      Comments: Patient denies any coughing or wheezing.  Lungs are clear to auscultation.    Abdominal:      General: Bowel sounds are normal.      Palpations: Abdomen is soft.      Comments: Abdomen is soft and nontender, no hepatosplenomegaly.  No flank tenderness.  No suprapubic pain.  Positive bowel sounds x4.     Genitourinary:     Comments: Patient denies dysuria, no hematuria, no nocturia, denies flank pain.  Musculoskeletal:         General: Normal range of motion.      Cervical back: Normal range of motion.      Comments: Age-related arthritis in the joints.  Mild restriction of motion cervical and lumbar spines due to muscle spasm.   Skin:     General: Skin is warm.      Comments: There is no bruising, no erythema, no skin lesions noted, no rashes.   Neurological:      General: No focal deficit present.      Mental Status: He is alert and oriented to person, place, and time. Mental status is at baseline.      Comments: Patient does have ADHD and has been on Vyvanse in the past and currently Adderall and has been stable.  It helps him focus and pay attention and stay on task.  No focal neurosensory deficits are noted.  Patient denies any peripheral neuropathy.  Coordination and gait are stable.  Normal muscle strength upper and lower extremities.   Psychiatric:         Behavior: Behavior normal.         Thought Content: Thought content normal.         Judgment: Judgment normal.      Comments: Patient has normal mood and affect.  Patients anxious depression is stable and his ADHD is stable.  Thought content and judgment are stable.  No signs of vascular dementia.  Behavior is normal.     PLAN : Patient is a 33-year-old male who is evaluated today for recheck on blood pressure,  blood work and also refill on his ADHD medication.  There has been some difficulty in obtaining the Adderall from the local pharmacies and he did call his mail order pharmacy and we will attempt to mail it to the mail order this prescription.  I also reviewed his OARRS report and he has given us drug screens in the past and also signed the E consent form.  Patient will be notified of his blood results when completed and further recommendations will be made at that time.  He otherwise is stable and will stay on the same medications.    Problem List Items Addressed This Visit       Other fatigue    Relevant Orders    CBC    Comprehensive Metabolic Panel    Lipid Panel    Thyroid Stimulating Hormone    Dyslipidemia    Relevant Orders    CBC    Comprehensive Metabolic Panel    Lipid Panel    Thyroid Stimulating Hormone    High risk medication use    Relevant Orders    CBC    Comprehensive Metabolic Panel    Lipid Panel    Thyroid Stimulating Hormone    Attention deficit hyperactivity disorder (ADHD), predominantly hyperactive type    Relevant Orders    CBC    Comprehensive Metabolic Panel    Lipid Panel    Thyroid Stimulating Hormone    Factor 5 Leiden mutation, heterozygous (CMS/HCC)    Relevant Orders    CBC    Comprehensive Metabolic Panel    Lipid Panel    Thyroid Stimulating Hormone     Other Visit Diagnoses       Vitamin D deficiency        Relevant Orders    Vitamin D 25-Hydroxy,Total (for eval of Vitamin D levels)                 Paco Parrish, DO

## 2023-12-17 NOTE — RESULT ENCOUNTER NOTE
Red and white blood cell counts are normal     Blood  sugar , kidney and liver function are normal   Cholesterol  is normal at 164  Triglycerides are normal at 103.  Thyroid is normal   Vitamin D  is normal at 37.   Blood work all good .  Keep up the good work.

## 2023-12-18 DIAGNOSIS — Z72.820 SLEEP DEPRIVATION: ICD-10-CM

## 2023-12-19 PROCEDURE — RXMED WILLOW AMBULATORY MEDICATION CHARGE

## 2023-12-19 RX ORDER — TRAZODONE HYDROCHLORIDE 50 MG/1
50 TABLET ORAL NIGHTLY
Qty: 90 TABLET | Refills: 3 | Status: SHIPPED | OUTPATIENT
Start: 2023-12-19

## 2023-12-22 ENCOUNTER — PHARMACY VISIT (OUTPATIENT)
Dept: PHARMACY | Facility: CLINIC | Age: 33
End: 2023-12-22
Payer: COMMERCIAL

## 2024-01-23 DIAGNOSIS — F90.1 ATTENTION DEFICIT HYPERACTIVITY DISORDER (ADHD), PREDOMINANTLY HYPERACTIVE TYPE: ICD-10-CM

## 2024-01-23 PROCEDURE — RXMED WILLOW AMBULATORY MEDICATION CHARGE

## 2024-01-23 RX ORDER — DEXTROAMPHETAMINE SACCHARATE, AMPHETAMINE ASPARTATE MONOHYDRATE, DEXTROAMPHETAMINE SULFATE AND AMPHETAMINE SULFATE 7.5; 7.5; 7.5; 7.5 MG/1; MG/1; MG/1; MG/1
30 CAPSULE, EXTENDED RELEASE ORAL
Qty: 90 CAPSULE | Refills: 0 | Status: SHIPPED | OUTPATIENT
Start: 2024-01-23 | End: 2024-02-25 | Stop reason: SDUPTHER

## 2024-01-24 ENCOUNTER — PHARMACY VISIT (OUTPATIENT)
Dept: PHARMACY | Facility: CLINIC | Age: 34
End: 2024-01-24
Payer: COMMERCIAL

## 2024-02-19 ENCOUNTER — OFFICE VISIT (OUTPATIENT)
Dept: OPHTHALMOLOGY | Facility: CLINIC | Age: 34
End: 2024-02-19
Payer: COMMERCIAL

## 2024-02-19 DIAGNOSIS — Z86.69 HISTORY OF IRITIS: ICD-10-CM

## 2024-02-19 DIAGNOSIS — D68.51 FACTOR 5 LEIDEN MUTATION, HETEROZYGOUS (MULTI): ICD-10-CM

## 2024-02-19 DIAGNOSIS — H30.22 INTERMEDIATE UVEITIS OF LEFT EYE: ICD-10-CM

## 2024-02-19 DIAGNOSIS — Z79.899 HIGH RISK MEDICATION USE: ICD-10-CM

## 2024-02-19 DIAGNOSIS — Z15.89 HLA B27 (HLA B27 POSITIVE): ICD-10-CM

## 2024-02-19 DIAGNOSIS — H20.9 IRITIS: Primary | ICD-10-CM

## 2024-02-19 PROCEDURE — 92134 CPTRZ OPH DX IMG PST SGM RTA: CPT | Performed by: OPHTHALMOLOGY

## 2024-02-19 PROCEDURE — 99213 OFFICE O/P EST LOW 20 MIN: CPT | Performed by: OPHTHALMOLOGY

## 2024-02-19 ASSESSMENT — EXTERNAL EXAM - RIGHT EYE: OD_EXAM: NORMAL

## 2024-02-19 ASSESSMENT — VISUAL ACUITY
OD_CC: 20/20
METHOD: SNELLEN - LINEAR
OS_CC: 20/25-1

## 2024-02-19 ASSESSMENT — SLIT LAMP EXAM - LIDS
COMMENTS: GOOD POSITION
COMMENTS: GOOD POSITION

## 2024-02-19 ASSESSMENT — CUP TO DISC RATIO
OD_RATIO: .45
OS_RATIO: .45

## 2024-02-19 ASSESSMENT — TONOMETRY
OS_IOP_MMHG: 12
OD_IOP_MMHG: 12
IOP_METHOD: TONOPEN

## 2024-02-19 ASSESSMENT — EXTERNAL EXAM - LEFT EYE: OS_EXAM: NORMAL

## 2024-02-19 NOTE — PROGRESS NOTES
Assessment/Plan   Diagnoses and all orders for this visit:  Iritis  -     OCT, Retina - OU - Both Eyes  Factor 5 Leiden mutation, heterozygous (CMS/HCC)  HLA B27 (HLA B27 positive)  High risk medication use  History of iritis  Intermediate uveitis of left eye     Impression       1 H20.00 Acute iritis of left eye-Improving  More ikley it was intermediate uveitis left          Discussion   Acute iritis for 2nd time in 3 years: left eye B27 pos   first episode in 2016, responded well to tx over 2-3 months last time around   B27      Off all drops; doing well   His iritis can be assymptomatic until CME develops: so he needs f/u uveitis at least Q 6m   RTC 6 mo          Hi quality OCT  scans obtained   signal good      OCT OD - Normal Foveal Contour, No Edema, IS/OS Junction Normal   OCT OS - Normal Foveal Contour, No Edema, IS/OS Junction Normal

## 2024-02-25 DIAGNOSIS — F90.1 ATTENTION DEFICIT HYPERACTIVITY DISORDER (ADHD), PREDOMINANTLY HYPERACTIVE TYPE: ICD-10-CM

## 2024-02-26 RX ORDER — DEXTROAMPHETAMINE SACCHARATE, AMPHETAMINE ASPARTATE MONOHYDRATE, DEXTROAMPHETAMINE SULFATE AND AMPHETAMINE SULFATE 7.5; 7.5; 7.5; 7.5 MG/1; MG/1; MG/1; MG/1
30 CAPSULE, EXTENDED RELEASE ORAL
Qty: 90 CAPSULE | Refills: 0 | Status: SHIPPED | OUTPATIENT
Start: 2024-02-26 | End: 2024-03-28 | Stop reason: SDUPTHER

## 2024-02-27 PROCEDURE — RXMED WILLOW AMBULATORY MEDICATION CHARGE

## 2024-02-29 ENCOUNTER — PHARMACY VISIT (OUTPATIENT)
Dept: PHARMACY | Facility: CLINIC | Age: 34
End: 2024-02-29
Payer: COMMERCIAL

## 2024-03-21 PROCEDURE — RXMED WILLOW AMBULATORY MEDICATION CHARGE

## 2024-03-25 ENCOUNTER — PHARMACY VISIT (OUTPATIENT)
Dept: PHARMACY | Facility: CLINIC | Age: 34
End: 2024-03-25
Payer: COMMERCIAL

## 2024-03-28 DIAGNOSIS — F90.1 ATTENTION DEFICIT HYPERACTIVITY DISORDER (ADHD), PREDOMINANTLY HYPERACTIVE TYPE: ICD-10-CM

## 2024-03-28 RX ORDER — DEXTROAMPHETAMINE SACCHARATE, AMPHETAMINE ASPARTATE MONOHYDRATE, DEXTROAMPHETAMINE SULFATE AND AMPHETAMINE SULFATE 7.5; 7.5; 7.5; 7.5 MG/1; MG/1; MG/1; MG/1
30 CAPSULE, EXTENDED RELEASE ORAL
Qty: 30 CAPSULE | Refills: 0 | Status: SHIPPED | OUTPATIENT
Start: 2024-03-28 | End: 2024-04-25 | Stop reason: SDUPTHER

## 2024-03-29 PROCEDURE — RXMED WILLOW AMBULATORY MEDICATION CHARGE

## 2024-04-02 ENCOUNTER — PHARMACY VISIT (OUTPATIENT)
Dept: PHARMACY | Facility: CLINIC | Age: 34
End: 2024-04-02
Payer: COMMERCIAL

## 2024-04-02 DIAGNOSIS — Z31.41 FERTILITY TESTING: ICD-10-CM

## 2024-04-02 DIAGNOSIS — Z11.3 ENCOUNTER FOR SCREENING EXAMINATION FOR SEXUALLY TRANSMITTED DISEASE: ICD-10-CM

## 2024-04-25 ENCOUNTER — ANCILLARY PROCEDURE (OUTPATIENT)
Dept: ENDOCRINOLOGY | Facility: CLINIC | Age: 34
End: 2024-04-25
Payer: COMMERCIAL

## 2024-04-25 DIAGNOSIS — Z31.83 PROCREATIVE MANAGEMENT FOR ASSISTED FERTILITY PROCEDURE CYCLE: Primary | ICD-10-CM

## 2024-04-25 DIAGNOSIS — F90.1 ATTENTION DEFICIT HYPERACTIVITY DISORDER (ADHD), PREDOMINANTLY HYPERACTIVE TYPE: ICD-10-CM

## 2024-04-25 DIAGNOSIS — Z31.41 FERTILITY TESTING: ICD-10-CM

## 2024-04-25 DIAGNOSIS — Z11.3 ENCOUNTER FOR SCREENING EXAMINATION FOR SEXUALLY TRANSMITTED DISEASE: ICD-10-CM

## 2024-04-25 LAB
# OF VIALS: 2
% EX RESIDUAL CYTOPLASM (SEMEN): 0 %
% HEAD DEFECTS (SEMEN): 95.3 %
% NECK MIDPIECE (SEMEN): 27.8 %
% NORMAL (SEMEN): 4.8 % (ref 4–?)
% TAIL DEFECTS (SEMEN): 11.5 %
ABSTINENCE (DAYS): 4 DAYS (ref 2–7)
AGGLUTINATION (SEMEN): NO
ANALYZED TIME:: NORMAL
ANDROLOGY LAB ID#: NORMAL
CLUMPS (SEMEN): YES
COLLECTED COMPLETELY: YES
COLLECTION LOCATION:: NORMAL
COLLECTION METHOD:: NORMAL
CONCENTRATION CN (POST-WASH): 143.21 MILL/ML
CONCENTRATION(SEMEN): 222.22 MILL/ML (ref 15–?)
DEBRIS (SEMEN): YES
HBV SURFACE AG SERPL QL IA: NONREACTIVE
HCV AB SER QL: NONREACTIVE
HIV 1+2 AB+HIV1 P24 AG SERPL QL IA: NONREACTIVE
LEUKOCYTE (SEMEN): NEGATIVE
NON PROG. MOTILITY (SEMEN): 9 %
NON PROG. MOTILITY CN (POST-WASH): 8 %
PROG. MOTILITY (SEMEN): 65 % (ref 32–?)
PROG. MOTILITY CN (POST-WASH): 47 %
RECEIVED TIME:: NORMAL
REI PARTNER DOB: NORMAL
REI PARTNER NAME: NORMAL
SEMEN APPEARANCE: NORMAL
SEMEN LIQUEFACTION: NORMAL
SEMEN VISCOSITY: NORMAL
SPECIMEN ID REI: NORMAL
TOT. NO OF NORM. MOTILE SPERM (SEMEN): 12.41 MILL
TOT. NO OF NORM. SPERM (SEMEN): 16.89 MILL
TOTAL MOTILE SPERM PER VIAL (POST-THAW): 90.22 MILL/VIAL
TOTAL MOTILITY (SEMEN): 74 % (ref 40–?)
TOTAL MOTILITY CN (POST-WASH): 55 %
TOTAL NO OF MOTILE (SEMEN): 261.33 MILL
TOTAL NO OF MOTILE CN (POST-WASH): 252.59 MILL
TOTAL NO OF RND CELLS (SEMEN): 1.1 MILL (ref ?–5)
TOTAL NO OF SPERM (SEMEN): 355.56 MILL (ref 39–?)
TOTAL NO OF SPERM CN (POST-WASH): 458.29 MILL
TREPONEMA PALLIDUM IGG+IGM AB [PRESENCE] IN SERUM OR PLASMA BY IMMUNOASSAY: NONREACTIVE
UNIT VOLUME: 1.5
VOLUME (SEMEN): 1.6 ML (ref 1.5–?)
VOLUME CN (POST-WASH): 3.2 ML

## 2024-04-25 PROCEDURE — 87340 HEPATITIS B SURFACE AG IA: CPT

## 2024-04-25 PROCEDURE — 87591 N.GONORRHOEAE DNA AMP PROB: CPT

## 2024-04-25 PROCEDURE — 86803 HEPATITIS C AB TEST: CPT

## 2024-04-25 PROCEDURE — 36415 COLL VENOUS BLD VENIPUNCTURE: CPT

## 2024-04-25 PROCEDURE — 87389 HIV-1 AG W/HIV-1&-2 AB AG IA: CPT

## 2024-04-25 PROCEDURE — 89259 CRYOPRESERVATION SPERM: CPT | Performed by: OBSTETRICS & GYNECOLOGY

## 2024-04-25 PROCEDURE — 86780 TREPONEMA PALLIDUM: CPT

## 2024-04-25 PROCEDURE — 87491 CHLMYD TRACH DNA AMP PROBE: CPT

## 2024-04-25 PROCEDURE — 87800 DETECT AGNT MULT DNA DIREC: CPT

## 2024-04-25 RX ORDER — DEXTROAMPHETAMINE SACCHARATE, AMPHETAMINE ASPARTATE MONOHYDRATE, DEXTROAMPHETAMINE SULFATE AND AMPHETAMINE SULFATE 7.5; 7.5; 7.5; 7.5 MG/1; MG/1; MG/1; MG/1
30 CAPSULE, EXTENDED RELEASE ORAL
Qty: 30 CAPSULE | Refills: 0 | Status: SHIPPED | OUTPATIENT
Start: 2024-04-25 | End: 2024-05-28 | Stop reason: SDUPTHER

## 2024-04-25 RX ORDER — DEXTROAMPHETAMINE SACCHARATE, AMPHETAMINE ASPARTATE MONOHYDRATE, DEXTROAMPHETAMINE SULFATE AND AMPHETAMINE SULFATE 7.5; 7.5; 7.5; 7.5 MG/1; MG/1; MG/1; MG/1
30 CAPSULE, EXTENDED RELEASE ORAL
Qty: 30 CAPSULE | Refills: 0 | Status: CANCELLED | OUTPATIENT
Start: 2024-04-25 | End: 2024-05-25

## 2024-04-26 LAB
C TRACH RRNA SPEC QL NAA+PROBE: NEGATIVE
N GONORRHOEA DNA SPEC QL PROBE+SIG AMP: NEGATIVE

## 2024-04-30 ENCOUNTER — PHARMACY VISIT (OUTPATIENT)
Dept: PHARMACY | Facility: CLINIC | Age: 34
End: 2024-04-30
Payer: COMMERCIAL

## 2024-04-30 PROCEDURE — RXMED WILLOW AMBULATORY MEDICATION CHARGE

## 2024-05-28 DIAGNOSIS — F90.1 ATTENTION DEFICIT HYPERACTIVITY DISORDER (ADHD), PREDOMINANTLY HYPERACTIVE TYPE: ICD-10-CM

## 2024-05-28 DIAGNOSIS — Z31.41 FERTILITY TESTING: ICD-10-CM

## 2024-05-28 RX ORDER — DEXTROAMPHETAMINE SACCHARATE, AMPHETAMINE ASPARTATE MONOHYDRATE, DEXTROAMPHETAMINE SULFATE AND AMPHETAMINE SULFATE 7.5; 7.5; 7.5; 7.5 MG/1; MG/1; MG/1; MG/1
30 CAPSULE, EXTENDED RELEASE ORAL
Qty: 30 CAPSULE | Refills: 0 | Status: SHIPPED | OUTPATIENT
Start: 2024-05-28 | End: 2024-06-28

## 2024-05-29 ENCOUNTER — APPOINTMENT (OUTPATIENT)
Dept: ENDOCRINOLOGY | Facility: CLINIC | Age: 34
End: 2024-05-29

## 2024-05-29 ENCOUNTER — PHARMACY VISIT (OUTPATIENT)
Dept: PHARMACY | Facility: CLINIC | Age: 34
End: 2024-05-29
Payer: COMMERCIAL

## 2024-05-29 PROCEDURE — RXMED WILLOW AMBULATORY MEDICATION CHARGE

## 2024-05-30 ENCOUNTER — APPOINTMENT (OUTPATIENT)
Dept: ENDOCRINOLOGY | Facility: CLINIC | Age: 34
End: 2024-05-30

## 2024-06-10 ENCOUNTER — OFFICE VISIT (OUTPATIENT)
Dept: PRIMARY CARE | Facility: CLINIC | Age: 34
End: 2024-06-10
Payer: COMMERCIAL

## 2024-06-10 VITALS
BODY MASS INDEX: 30.1 KG/M2 | HEIGHT: 71 IN | RESPIRATION RATE: 16 BRPM | SYSTOLIC BLOOD PRESSURE: 126 MMHG | TEMPERATURE: 97.9 F | DIASTOLIC BLOOD PRESSURE: 75 MMHG | HEART RATE: 67 BPM | OXYGEN SATURATION: 97 % | WEIGHT: 215 LBS

## 2024-06-10 DIAGNOSIS — E78.5 DYSLIPIDEMIA: ICD-10-CM

## 2024-06-10 DIAGNOSIS — F90.1 ATTENTION DEFICIT HYPERACTIVITY DISORDER (ADHD), PREDOMINANTLY HYPERACTIVE TYPE: ICD-10-CM

## 2024-06-10 DIAGNOSIS — E55.9 VITAMIN D DEFICIENCY: ICD-10-CM

## 2024-06-10 DIAGNOSIS — Z15.89 HLA B27 (HLA B27 POSITIVE): Primary | ICD-10-CM

## 2024-06-10 DIAGNOSIS — D68.51 FACTOR 5 LEIDEN MUTATION, HETEROZYGOUS (MULTI): ICD-10-CM

## 2024-06-10 DIAGNOSIS — R53.83 OTHER FATIGUE: ICD-10-CM

## 2024-06-10 DIAGNOSIS — Z79.899 HIGH RISK MEDICATION USE: ICD-10-CM

## 2024-06-10 LAB
25(OH)D3 SERPL-MCNC: 38 NG/ML (ref 30–100)
ALBUMIN SERPL BCP-MCNC: 4.7 G/DL (ref 3.4–5)
ALP SERPL-CCNC: 78 U/L (ref 33–120)
ALT SERPL W P-5'-P-CCNC: 27 U/L (ref 10–52)
ANION GAP SERPL CALC-SCNC: 15 MMOL/L (ref 10–20)
AST SERPL W P-5'-P-CCNC: 26 U/L (ref 9–39)
BILIRUB SERPL-MCNC: 1.1 MG/DL (ref 0–1.2)
BUN SERPL-MCNC: 11 MG/DL (ref 6–23)
CALCIUM SERPL-MCNC: 9.5 MG/DL (ref 8.6–10.6)
CHLORIDE SERPL-SCNC: 100 MMOL/L (ref 98–107)
CHOLEST SERPL-MCNC: 184 MG/DL (ref 0–199)
CHOLESTEROL/HDL RATIO: 3.8
CO2 SERPL-SCNC: 30 MMOL/L (ref 21–32)
CREAT SERPL-MCNC: 0.84 MG/DL (ref 0.5–1.3)
EGFRCR SERPLBLD CKD-EPI 2021: >90 ML/MIN/1.73M*2
ERYTHROCYTE [DISTWIDTH] IN BLOOD BY AUTOMATED COUNT: 12.3 % (ref 11.5–14.5)
GLUCOSE SERPL-MCNC: 100 MG/DL (ref 74–99)
HCT VFR BLD AUTO: 44.8 % (ref 41–52)
HDLC SERPL-MCNC: 48.3 MG/DL
HGB BLD-MCNC: 15.3 G/DL (ref 13.5–17.5)
LDLC SERPL CALC-MCNC: 117 MG/DL
MCH RBC QN AUTO: 30.9 PG (ref 26–34)
MCHC RBC AUTO-ENTMCNC: 34.2 G/DL (ref 32–36)
MCV RBC AUTO: 91 FL (ref 80–100)
NON HDL CHOLESTEROL: 136 MG/DL (ref 0–149)
NRBC BLD-RTO: 0 /100 WBCS (ref 0–0)
PLATELET # BLD AUTO: 168 X10*3/UL (ref 150–450)
POTASSIUM SERPL-SCNC: 3.7 MMOL/L (ref 3.5–5.3)
PROT SERPL-MCNC: 7.1 G/DL (ref 6.4–8.2)
RBC # BLD AUTO: 4.95 X10*6/UL (ref 4.5–5.9)
SODIUM SERPL-SCNC: 141 MMOL/L (ref 136–145)
TRIGL SERPL-MCNC: 96 MG/DL (ref 0–149)
TSH SERPL-ACNC: 1.65 MIU/L (ref 0.44–3.98)
VLDL: 19 MG/DL (ref 0–40)
WBC # BLD AUTO: 6.6 X10*3/UL (ref 4.4–11.3)

## 2024-06-10 PROCEDURE — 36415 COLL VENOUS BLD VENIPUNCTURE: CPT

## 2024-06-10 PROCEDURE — 82306 VITAMIN D 25 HYDROXY: CPT

## 2024-06-10 PROCEDURE — 84443 ASSAY THYROID STIM HORMONE: CPT

## 2024-06-10 PROCEDURE — 85027 COMPLETE CBC AUTOMATED: CPT

## 2024-06-10 PROCEDURE — 80061 LIPID PANEL: CPT

## 2024-06-10 PROCEDURE — 1036F TOBACCO NON-USER: CPT | Performed by: FAMILY MEDICINE

## 2024-06-10 PROCEDURE — 99214 OFFICE O/P EST MOD 30 MIN: CPT | Performed by: FAMILY MEDICINE

## 2024-06-10 PROCEDURE — 80053 COMPREHEN METABOLIC PANEL: CPT

## 2024-06-10 ASSESSMENT — PAIN SCALES - GENERAL: PAINLEVEL: 0-NO PAIN

## 2024-06-10 NOTE — PROGRESS NOTES
Subjective   Raghu Khan is a 33 y.o. male who presents for Follow-up (Follow up visit/).    HPI  : Patient is a 33-year-old male who is in for recheck on blood work and also for his ADHD.  He has been stable on Adderall XR 30 mg daily he has been taking Adderall since college.  He also takes trazodone at bedtime which does help him sleep and helps control some anxious depression.  Patient also has Leiden factor V mutation and he is HLA-B27 positive.  His father is also diabetic and the patient does watch his sugar  and  his diet.    Objective  : ROS :10 systems were reviewed and the information is included in the HPI and no additional review of systems is indicated.    Physical Exam  Vitals and nursing note reviewed.   Constitutional:       Appearance: Normal appearance.      Comments: Patient is alert and oriented x3.   No acute distress   HENT:      Head: Normocephalic.      Right Ear: Tympanic membrane and external ear normal.      Left Ear: Tympanic membrane and external ear normal.      Ears:      Comments: Ears are patent bilaterally and TMs are clear.     Nose: Nose normal.      Mouth/Throat:      Mouth: Mucous membranes are moist.      Pharynx: Oropharynx is clear.      Comments: Mouth is moist, tongue is midline.  No posterior pharyngeal erythema.  Eyes:      Extraocular Movements: Extraocular movements intact.      Conjunctiva/sclera: Conjunctivae normal.      Pupils: Pupils are equal, round, and reactive to light.      Comments: Patient does follow with ophthalmology due to having a previous issue with uveitis.   Neck:      Comments: No carotid bruits, no thyromegaly, no cervical adenopathy.  Occasional neck spasm and restriction of motion secondary to stress and tension.  Cardiovascular:      Rate and Rhythm: Normal rate and regular rhythm.      Pulses: Normal pulses.      Heart sounds: Normal heart sounds.      Comments: Patient denies chest pain and no palpitations.  Heart rhythm is stable S1  and S2 are noted, no ectopics.  Pulmonary:      Effort: Pulmonary effort is normal.      Breath sounds: Normal breath sounds.      Comments: Patient denies any coughing or wheezing.  Lungs are clear to auscultation.    Abdominal:      General: Bowel sounds are normal.      Palpations: Abdomen is soft.      Comments: Patient did have some lower abdominal pain a few weeks ago and was concerned it might be a kidney stone but it did resolve in 2 days.  He does have a follow-up appointment with urology.  Abdomen is soft and nontender,  No flank tenderness.  No suprapubic pain.  Positive bowel sounds x4.  No abdominal guarding and no rebound tenderness.   Genitourinary:     Comments: Patient denies dysuria, no hematuria, no nocturia, denies flank pain. History of kidney stones.  Patient does follow with urology and will be having a exam next week.  Musculoskeletal:         General: Normal range of motion.      Cervical back: Normal range of motion and neck supple.      Comments: Patient denies any specific musculoskeletal complaints.  No specific joint aches or pains..   Skin:     General: Skin is warm.      Comments: There is no bruising, no erythema, no skin lesions noted, no rashes.   Neurological:      General: No focal deficit present.      Mental Status: He is alert and oriented to person, place, and time. Mental status is at baseline.      Comments: Patient does have ADHD and has been under treatment since college.  The medication does help his concentration and focus and keeps him on task.  No focal neurosensory deficits are noted.  Patient denies any peripheral neuropathy.  Coordination and gait are stable.  Normal muscle strength upper and lower extremities.   Psychiatric:         Mood and Affect: Mood normal.         Behavior: Behavior normal.         Thought Content: Thought content normal.         Judgment: Judgment normal.      Comments: Does have some issues with anxious depression and insomnia which has  responded very well to trazodone.  Patient has normal mood and affect.  Thought content and judgment are stable.  No signs of vascular dementia.  Behavior is normal.     PLAN : Patient is a 33-year-old male who is evaluated today for a follow-up exam.  He did have his blood work checked and will be notified of the results in 3 days and further recommendations will be made at that time.  He does have some concerns about his vitamin D and also his liver function.  He is on Adderall    for adult ADHD , and takes trazodone at bedtime.  Patient otherwise has stable blood pressure and vitals are stable.  Further recommendations will be made after review of his blood results.  I did review his OARRS report as well.  He will follow-up in 4 to 6 months or sooner if needed.    Problem List Items Addressed This Visit       Other fatigue    Relevant Orders    CBC    Comprehensive Metabolic Panel    Lipid Panel    Thyroid Stimulating Hormone    Dyslipidemia    Relevant Orders    CBC    Comprehensive Metabolic Panel    Lipid Panel    Thyroid Stimulating Hormone    High risk medication use    Attention deficit hyperactivity disorder (ADHD), predominantly hyperactive type    Relevant Orders    CBC    Comprehensive Metabolic Panel    Lipid Panel    Thyroid Stimulating Hormone    Factor 5 Leiden mutation, heterozygous (Multi)    Relevant Orders    CBC    Comprehensive Metabolic Panel    Lipid Panel    Thyroid Stimulating Hormone    HLA B27 (HLA B27 positive) - Primary    Vitamin D deficiency    Relevant Orders    CBC    Comprehensive Metabolic Panel    Lipid Panel    Thyroid Stimulating Hormone    Vitamin D 25-Hydroxy,Total (for eval of Vitamin D levels)            Paco Parrish DO

## 2024-06-13 NOTE — RESULT ENCOUNTER NOTE
Blood  sugar ,  kidney  and liver  function  all normal.    Cholesterol normal  at  184   Triglycerides   normal  at  96.    Thyroid  function normal .    Vitamin  D normal at  38.    Red  and  white  blood   counts  normal       Blood   work  all  looks  good    keep  up the   good  work .

## 2024-06-22 PROCEDURE — RXMED WILLOW AMBULATORY MEDICATION CHARGE

## 2024-06-24 ENCOUNTER — HOSPITAL ENCOUNTER (OUTPATIENT)
Dept: RADIOLOGY | Facility: HOSPITAL | Age: 34
Discharge: HOME | End: 2024-06-24
Payer: COMMERCIAL

## 2024-06-24 DIAGNOSIS — N20.1 CALCULUS OF URETER: ICD-10-CM

## 2024-06-24 PROCEDURE — 74018 RADEX ABDOMEN 1 VIEW: CPT

## 2024-06-26 ENCOUNTER — PHARMACY VISIT (OUTPATIENT)
Dept: PHARMACY | Facility: CLINIC | Age: 34
End: 2024-06-26
Payer: COMMERCIAL

## 2024-07-03 ENCOUNTER — PHARMACY VISIT (OUTPATIENT)
Dept: PHARMACY | Facility: CLINIC | Age: 34
End: 2024-07-03
Payer: COMMERCIAL

## 2024-07-03 DIAGNOSIS — F90.1 ATTENTION DEFICIT HYPERACTIVITY DISORDER (ADHD), PREDOMINANTLY HYPERACTIVE TYPE: ICD-10-CM

## 2024-07-03 PROCEDURE — RXMED WILLOW AMBULATORY MEDICATION CHARGE

## 2024-07-03 RX ORDER — DEXTROAMPHETAMINE SACCHARATE, AMPHETAMINE ASPARTATE MONOHYDRATE, DEXTROAMPHETAMINE SULFATE AND AMPHETAMINE SULFATE 7.5; 7.5; 7.5; 7.5 MG/1; MG/1; MG/1; MG/1
30 CAPSULE, EXTENDED RELEASE ORAL
Qty: 30 CAPSULE | Refills: 0 | Status: SHIPPED | OUTPATIENT
Start: 2024-07-03 | End: 2024-08-02

## 2024-08-05 DIAGNOSIS — F90.1 ATTENTION DEFICIT HYPERACTIVITY DISORDER (ADHD), PREDOMINANTLY HYPERACTIVE TYPE: ICD-10-CM

## 2024-08-05 PROCEDURE — RXMED WILLOW AMBULATORY MEDICATION CHARGE

## 2024-08-05 RX ORDER — DEXTROAMPHETAMINE SACCHARATE, AMPHETAMINE ASPARTATE MONOHYDRATE, DEXTROAMPHETAMINE SULFATE AND AMPHETAMINE SULFATE 7.5; 7.5; 7.5; 7.5 MG/1; MG/1; MG/1; MG/1
30 CAPSULE, EXTENDED RELEASE ORAL
Qty: 30 CAPSULE | Refills: 0 | Status: SHIPPED | OUTPATIENT
Start: 2024-08-05 | End: 2024-09-05

## 2024-08-06 ENCOUNTER — PHARMACY VISIT (OUTPATIENT)
Dept: PHARMACY | Facility: CLINIC | Age: 34
End: 2024-08-06
Payer: COMMERCIAL

## 2024-08-19 ENCOUNTER — APPOINTMENT (OUTPATIENT)
Dept: OPHTHALMOLOGY | Facility: CLINIC | Age: 34
End: 2024-08-19
Payer: COMMERCIAL

## 2024-08-19 DIAGNOSIS — H30.22 INTERMEDIATE UVEITIS OF LEFT EYE: Primary | ICD-10-CM

## 2024-08-19 DIAGNOSIS — D68.51 FACTOR 5 LEIDEN MUTATION, HETEROZYGOUS (MULTI): ICD-10-CM

## 2024-08-19 DIAGNOSIS — Z86.69 HISTORY OF IRITIS: ICD-10-CM

## 2024-08-19 PROCEDURE — 92134 CPTRZ OPH DX IMG PST SGM RTA: CPT | Mod: BILATERAL PROCEDURE | Performed by: OPHTHALMOLOGY

## 2024-08-19 PROCEDURE — 99214 OFFICE O/P EST MOD 30 MIN: CPT | Performed by: OPHTHALMOLOGY

## 2024-08-19 ASSESSMENT — SLIT LAMP EXAM - LIDS
COMMENTS: GOOD POSITION
COMMENTS: GOOD POSITION

## 2024-08-19 ASSESSMENT — TONOMETRY
OS_IOP_MMHG: 16
OD_IOP_MMHG: 16
IOP_METHOD: GOLDMANN APPLANATION

## 2024-08-19 ASSESSMENT — ENCOUNTER SYMPTOMS
GASTROINTESTINAL NEGATIVE: 1
EYES NEGATIVE: 1

## 2024-08-19 ASSESSMENT — CUP TO DISC RATIO
OD_RATIO: .45
OS_RATIO: .45

## 2024-08-19 ASSESSMENT — VISUAL ACUITY
OD_CC: 20/20
METHOD: SNELLEN - LINEAR
CORRECTION_TYPE: GLASSES
OS_CC: 20/20

## 2024-08-19 ASSESSMENT — EXTERNAL EXAM - RIGHT EYE: OD_EXAM: NORMAL

## 2024-08-19 ASSESSMENT — EXTERNAL EXAM - LEFT EYE: OS_EXAM: NORMAL

## 2024-08-19 NOTE — PROGRESS NOTES
Assessment/Plan   Diagnoses and all orders for this visit:  Intermediate uveitis of left eye  -     OCT, Retina - OU - Both Eyes  History of iritis  Factor 5 Leiden mutation, heterozygous (Multi)        DIAGNOSTIC PROCEDURE DONE    OCT DONE OD/OS            REASON FOR TEST: will help address and tailor  therapy by detecting subclinical CME SRF     Hi quality OCT  scans obtained  signal good    OCT OD - Normal Foveal Contour, No Edema, IS/OS Junction Normal  OCT OS - Normal Foveal Contour, No Edema, IS/OS Junction Normal    additional commnents:     Impression       1 H20.00 Acute iritis of left eye-Improving  More angelique it was intermediate uveitis left          Discussion   Acute iritis for 2nd time in 3 years: left eye B27 pos   first episode in 2016, responded well to tx over 2-3 months last time around   B27      Off all drops; doing well   His iritis can be assymptomatic until CME develops: so he needs f/u uveitis at least Q 6m   RTC 6 mo

## 2024-09-01 DIAGNOSIS — F90.1 ATTENTION DEFICIT HYPERACTIVITY DISORDER (ADHD), PREDOMINANTLY HYPERACTIVE TYPE: ICD-10-CM

## 2024-09-03 PROCEDURE — RXMED WILLOW AMBULATORY MEDICATION CHARGE

## 2024-09-03 RX ORDER — DEXTROAMPHETAMINE SACCHARATE, AMPHETAMINE ASPARTATE MONOHYDRATE, DEXTROAMPHETAMINE SULFATE AND AMPHETAMINE SULFATE 7.5; 7.5; 7.5; 7.5 MG/1; MG/1; MG/1; MG/1
30 CAPSULE, EXTENDED RELEASE ORAL
Qty: 30 CAPSULE | Refills: 0 | Status: SHIPPED | OUTPATIENT
Start: 2024-09-03 | End: 2024-10-05

## 2024-09-05 ENCOUNTER — PHARMACY VISIT (OUTPATIENT)
Dept: PHARMACY | Facility: CLINIC | Age: 34
End: 2024-09-05
Payer: COMMERCIAL

## 2024-09-17 PROCEDURE — RXMED WILLOW AMBULATORY MEDICATION CHARGE

## 2024-09-20 ENCOUNTER — PHARMACY VISIT (OUTPATIENT)
Dept: PHARMACY | Facility: CLINIC | Age: 34
End: 2024-09-20
Payer: COMMERCIAL

## 2024-09-28 DIAGNOSIS — F90.1 ATTENTION DEFICIT HYPERACTIVITY DISORDER (ADHD), PREDOMINANTLY HYPERACTIVE TYPE: ICD-10-CM

## 2024-09-30 RX ORDER — DEXTROAMPHETAMINE SACCHARATE, AMPHETAMINE ASPARTATE MONOHYDRATE, DEXTROAMPHETAMINE SULFATE AND AMPHETAMINE SULFATE 7.5; 7.5; 7.5; 7.5 MG/1; MG/1; MG/1; MG/1
30 CAPSULE, EXTENDED RELEASE ORAL
Qty: 30 CAPSULE | Refills: 0 | Status: SHIPPED | OUTPATIENT
Start: 2024-09-30 | End: 2024-11-02

## 2024-10-03 ENCOUNTER — PHARMACY VISIT (OUTPATIENT)
Dept: PHARMACY | Facility: CLINIC | Age: 34
End: 2024-10-03
Payer: COMMERCIAL

## 2024-10-03 PROCEDURE — RXMED WILLOW AMBULATORY MEDICATION CHARGE

## 2024-11-02 DIAGNOSIS — F90.1 ATTENTION DEFICIT HYPERACTIVITY DISORDER (ADHD), PREDOMINANTLY HYPERACTIVE TYPE: ICD-10-CM

## 2024-11-04 PROCEDURE — RXMED WILLOW AMBULATORY MEDICATION CHARGE

## 2024-11-04 RX ORDER — DEXTROAMPHETAMINE SACCHARATE, AMPHETAMINE ASPARTATE MONOHYDRATE, DEXTROAMPHETAMINE SULFATE AND AMPHETAMINE SULFATE 7.5; 7.5; 7.5; 7.5 MG/1; MG/1; MG/1; MG/1
30 CAPSULE, EXTENDED RELEASE ORAL
Qty: 30 CAPSULE | Refills: 0 | Status: SHIPPED | OUTPATIENT
Start: 2024-11-04 | End: 2024-12-05

## 2024-11-05 ENCOUNTER — PHARMACY VISIT (OUTPATIENT)
Dept: PHARMACY | Facility: CLINIC | Age: 34
End: 2024-11-05
Payer: COMMERCIAL

## 2024-11-11 ENCOUNTER — APPOINTMENT (OUTPATIENT)
Dept: PRIMARY CARE | Facility: CLINIC | Age: 34
End: 2024-11-11
Payer: COMMERCIAL

## 2024-11-18 ENCOUNTER — APPOINTMENT (OUTPATIENT)
Dept: PRIMARY CARE | Facility: CLINIC | Age: 34
End: 2024-11-18
Payer: COMMERCIAL

## 2024-11-18 ENCOUNTER — OFFICE VISIT (OUTPATIENT)
Dept: PRIMARY CARE | Facility: CLINIC | Age: 34
End: 2024-11-18
Payer: COMMERCIAL

## 2024-11-18 VITALS
HEART RATE: 74 BPM | HEIGHT: 71 IN | OXYGEN SATURATION: 98 % | TEMPERATURE: 97.9 F | DIASTOLIC BLOOD PRESSURE: 74 MMHG | BODY MASS INDEX: 30.94 KG/M2 | SYSTOLIC BLOOD PRESSURE: 120 MMHG | RESPIRATION RATE: 18 BRPM | WEIGHT: 221 LBS

## 2024-11-18 DIAGNOSIS — Z15.89 HLA B27 (HLA B27 POSITIVE): ICD-10-CM

## 2024-11-18 DIAGNOSIS — F90.1 ATTENTION DEFICIT HYPERACTIVITY DISORDER (ADHD), PREDOMINANTLY HYPERACTIVE TYPE: Primary | ICD-10-CM

## 2024-11-18 DIAGNOSIS — D68.51 FACTOR 5 LEIDEN MUTATION, HETEROZYGOUS (MULTI): ICD-10-CM

## 2024-11-18 DIAGNOSIS — M41.34 THORACOGENIC SCOLIOSIS OF THORACIC REGION: ICD-10-CM

## 2024-11-18 DIAGNOSIS — M41.24 OTHER IDIOPATHIC SCOLIOSIS, THORACIC REGION: ICD-10-CM

## 2024-11-18 DIAGNOSIS — Z86.69 HISTORY OF IRITIS: ICD-10-CM

## 2024-11-18 DIAGNOSIS — Z79.899 HIGH RISK MEDICATION USE: ICD-10-CM

## 2024-11-18 PROCEDURE — 1036F TOBACCO NON-USER: CPT | Performed by: FAMILY MEDICINE

## 2024-11-18 PROCEDURE — 99214 OFFICE O/P EST MOD 30 MIN: CPT | Performed by: FAMILY MEDICINE

## 2024-11-18 PROCEDURE — 3008F BODY MASS INDEX DOCD: CPT | Performed by: FAMILY MEDICINE

## 2024-11-18 ASSESSMENT — PATIENT HEALTH QUESTIONNAIRE - PHQ9
1. LITTLE INTEREST OR PLEASURE IN DOING THINGS: NOT AT ALL
SUM OF ALL RESPONSES TO PHQ9 QUESTIONS 1 AND 2: 0
2. FEELING DOWN, DEPRESSED OR HOPELESS: NOT AT ALL
1. LITTLE INTEREST OR PLEASURE IN DOING THINGS: NOT AT ALL
SUM OF ALL RESPONSES TO PHQ9 QUESTIONS 1 AND 2: 0
2. FEELING DOWN, DEPRESSED OR HOPELESS: NOT AT ALL

## 2024-11-18 ASSESSMENT — PAIN SCALES - GENERAL: PAINLEVEL_OUTOF10: 0-NO PAIN

## 2024-11-18 NOTE — PROGRESS NOTES
Subjective   Raghu Khan is a 34 y.o. male who presents for Follow-up (Follow up visit, blood pressure check and medication review today).    HPI : Patient is a 34-year-old male who is in for a follow-up visit for his ADHD and also for discussion concerning a recent abdominal x-ray he had which showed a curvature of his thoracic spine.  He has a history of renal lithiasis and had a KUB done which showed some scoliosis of the thoracic spine.  He was unaware of this at any other previous x-ray results.  I did review the x-rays with him and there is a mild thoracic curvature probably 5 degrees at most.  He has no indications of other curvatures of his spine.  He also has signed the E consent form and I do review his OARRS report prior to prescribing his Adderall.  He has been on this medication since college and it has helped him significantly.  She also has a history of factor V Leiden mutation and has been evaluated by hematology regarding this hereditary problem.  He also is positive for HLA-B27.      Objective  : ROS :10 systems were reviewed and the information is included in the HPI and no additional review of systems is indicated.    Physical Exam  Vitals and nursing note reviewed.   Constitutional:       Appearance: Normal appearance.      Comments: Patient is alert and oriented x3.   No acute distress   HENT:      Head: Normocephalic.      Right Ear: Tympanic membrane and external ear normal.      Left Ear: Tympanic membrane and external ear normal.      Ears:      Comments: Ears are patent bilaterally and TMs are clear.     Nose: Nose normal.      Mouth/Throat:      Mouth: Mucous membranes are moist.      Pharynx: Oropharynx is clear.      Comments: Mouth is moist, tongue is midline.  No posterior pharyngeal erythema.  Eyes:      Extraocular Movements: Extraocular movements intact.      Conjunctiva/sclera: Conjunctivae normal.      Pupils: Pupils are equal, round, and reactive to light.       Comments: No visual disturbance, does have his  eyes examined once a year.   Neck:      Comments: No carotid bruits, no thyromegaly, no cervical adenopathy.  Occasional neck spasm and restriction of motion secondary to stress and tension.  Cardiovascular:      Rate and Rhythm: Normal rate and regular rhythm.      Pulses: Normal pulses.      Heart sounds: Normal heart sounds.      Comments: Patient denies chest pain and no palpitations.  Heart rhythm is stable S1 and S2 are noted, no ectopics.  Pulmonary:      Effort: Pulmonary effort is normal.      Breath sounds: Normal breath sounds.      Comments: Patient denies any coughing or wheezing.  Lungs are clear to auscultation.    Abdominal:      General: Bowel sounds are normal.      Palpations: Abdomen is soft.      Comments: Abdomen is soft and mildly obese but nontender.  No flank tenderness.  No suprapubic pain.  Positive bowel sounds x4.  No abdominal guarding and no rebound tenderness.   Genitourinary:     Comments: Patient denies dysuria, no hematuria, no nocturia, denies flank pain.  Had KUB in September.  Musculoskeletal:         General: Deformity: Mildthoracic lumbar curvature.. Normal range of motion.      Cervical back: Normal range of motion and neck supple.      Comments: Patient does have a mild thoracic scoliosis which is visible on his recent abdominal x-ray.  He has no signs of leg length discrepancies or short leg syndrome.  His hips seem to be equal and he is essentially asymptomatic of the approximately 5 degree scoliosis.  This can be reevaluated later with a scoliosis series x-ray and I did discuss this with him.  He could also start physical therapy if he has any interest in exercises to improve this mild scoliosis.  He will think about this.  We will discuss this again on his next follow-up visit.   Skin:     General: Skin is warm.      Comments: There is no bruising, no erythema, no skin lesions noted, no rashes.   Neurological:      General:  No focal deficit present.      Mental Status: He is alert and oriented to person, place, and time. Mental status is at baseline.      Comments: Patient has been treated for adult ADHD since college and is stable and doing well.  His focus and concentration are stable.  He also does have chronic insomnia and takes trazodone at bedtime with good results.  No focal neurosensory deficits are noted.  Patient denies any peripheral neuropathy.  Coordination and gait are stable.  Normal muscle strength upper and lower extremities.   Psychiatric:         Mood and Affect: Mood normal.         Behavior: Behavior normal.         Thought Content: Thought content normal.         Judgment: Judgment normal.      Comments: Patient has normal mood and affect.  Thought content and judgment are stable.  No signs of vascular dementia.  Behavior is normal.     PLAN : Patient is a 34-year-old male who was evaluated today for medication review, follow-up visit on his ADHD medication which does continue to help his focus and concentration.  He also takes trazodone at bedtime which also helps his insomnia and mild depression.  Patient has some other concerns about a thoracic scoliosis that was noted on a abdominal x-ray that he recently had.  We did review the x-ray on the computer and it is a very mild curvature probably about 5 degrees.  I did explain to him that we could do a scoliosis series and he could attend physical therapy for rehabilitation exercises but he would like to wait and see how he feels in the next few months.  He is totally asymptomatic of the curvature and has no thoracic back pain.  He will follow-up in 4 to 6 months or sooner as needed.    Problem List Items Addressed This Visit    None           Paco Parrish, DO

## 2024-12-04 DIAGNOSIS — F90.1 ATTENTION DEFICIT HYPERACTIVITY DISORDER (ADHD), PREDOMINANTLY HYPERACTIVE TYPE: ICD-10-CM

## 2024-12-05 ENCOUNTER — PHARMACY VISIT (OUTPATIENT)
Dept: PHARMACY | Facility: CLINIC | Age: 34
End: 2024-12-05
Payer: COMMERCIAL

## 2024-12-05 PROCEDURE — RXMED WILLOW AMBULATORY MEDICATION CHARGE

## 2024-12-05 RX ORDER — DEXTROAMPHETAMINE SACCHARATE, AMPHETAMINE ASPARTATE MONOHYDRATE, DEXTROAMPHETAMINE SULFATE AND AMPHETAMINE SULFATE 7.5; 7.5; 7.5; 7.5 MG/1; MG/1; MG/1; MG/1
30 CAPSULE, EXTENDED RELEASE ORAL
Qty: 30 CAPSULE | Refills: 0 | Status: SHIPPED | OUTPATIENT
Start: 2024-12-05 | End: 2025-01-04

## 2024-12-17 DIAGNOSIS — Z72.820 SLEEP DEPRIVATION: ICD-10-CM

## 2024-12-18 PROCEDURE — RXMED WILLOW AMBULATORY MEDICATION CHARGE

## 2024-12-18 RX ORDER — TRAZODONE HYDROCHLORIDE 50 MG/1
50 TABLET ORAL NIGHTLY
Qty: 90 TABLET | Refills: 3 | Status: SHIPPED | OUTPATIENT
Start: 2024-12-18

## 2024-12-20 ENCOUNTER — PHARMACY VISIT (OUTPATIENT)
Dept: PHARMACY | Facility: CLINIC | Age: 34
End: 2024-12-20
Payer: COMMERCIAL

## 2025-01-04 DIAGNOSIS — F90.1 ATTENTION DEFICIT HYPERACTIVITY DISORDER (ADHD), PREDOMINANTLY HYPERACTIVE TYPE: ICD-10-CM

## 2025-01-06 PROCEDURE — RXMED WILLOW AMBULATORY MEDICATION CHARGE

## 2025-01-06 RX ORDER — DEXTROAMPHETAMINE SACCHARATE, AMPHETAMINE ASPARTATE MONOHYDRATE, DEXTROAMPHETAMINE SULFATE AND AMPHETAMINE SULFATE 7.5; 7.5; 7.5; 7.5 MG/1; MG/1; MG/1; MG/1
30 CAPSULE, EXTENDED RELEASE ORAL
Qty: 30 CAPSULE | Refills: 0 | Status: SHIPPED | OUTPATIENT
Start: 2025-01-06 | End: 2025-02-07

## 2025-01-08 ENCOUNTER — PHARMACY VISIT (OUTPATIENT)
Dept: PHARMACY | Facility: CLINIC | Age: 35
End: 2025-01-08
Payer: COMMERCIAL

## 2025-02-06 DIAGNOSIS — Z11.3 ENCOUNTER FOR SCREENING EXAMINATION FOR SEXUALLY TRANSMITTED DISEASE: Primary | ICD-10-CM

## 2025-02-06 DIAGNOSIS — Z31.41 FERTILITY TESTING: ICD-10-CM

## 2025-02-07 DIAGNOSIS — F90.1 ATTENTION DEFICIT HYPERACTIVITY DISORDER (ADHD), PREDOMINANTLY HYPERACTIVE TYPE: ICD-10-CM

## 2025-02-08 LAB
C TRACH RRNA SPEC QL NAA+PROBE: NOT DETECTED
HBV SURFACE AG SERPL QL IA: NORMAL
HCV AB SERPL QL IA: NORMAL
HIV 1+2 AB+HIV1 P24 AG SERPL QL IA: NORMAL
N GONORRHOEA RRNA SPEC QL NAA+PROBE: NOT DETECTED
QUEST GC CT AMPLIFIED (ALWAYS MESSAGE): NORMAL
T PALLIDUM AB SER QL IA: NORMAL

## 2025-02-11 LAB
C TRACH RRNA SPEC QL NAA+PROBE: NOT DETECTED
HBV SURFACE AG SERPL QL IA: NORMAL
HCV AB SERPL QL IA: NORMAL
HIV 1+2 AB+HIV1 P24 AG SERPL QL IA: NORMAL
N GONORRHOEA RRNA SPEC QL NAA+PROBE: NOT DETECTED
QUEST GC CT AMPLIFIED (ALWAYS MESSAGE): NORMAL
T PALLIDUM AB SER QL IA: NEGATIVE

## 2025-02-12 RX ORDER — DEXTROAMPHETAMINE SACCHARATE, AMPHETAMINE ASPARTATE MONOHYDRATE, DEXTROAMPHETAMINE SULFATE AND AMPHETAMINE SULFATE 7.5; 7.5; 7.5; 7.5 MG/1; MG/1; MG/1; MG/1
30 CAPSULE, EXTENDED RELEASE ORAL
Qty: 30 CAPSULE | Refills: 0 | Status: SHIPPED | OUTPATIENT
Start: 2025-02-12 | End: 2025-03-15

## 2025-02-13 ENCOUNTER — PHARMACY VISIT (OUTPATIENT)
Dept: PHARMACY | Facility: CLINIC | Age: 35
End: 2025-02-13
Payer: COMMERCIAL

## 2025-02-13 PROCEDURE — RXMED WILLOW AMBULATORY MEDICATION CHARGE

## 2025-02-26 ENCOUNTER — APPOINTMENT (OUTPATIENT)
Dept: OPHTHALMOLOGY | Facility: CLINIC | Age: 35
End: 2025-02-26
Payer: COMMERCIAL

## 2025-02-26 DIAGNOSIS — Z86.69 HISTORY OF IRITIS: ICD-10-CM

## 2025-02-26 DIAGNOSIS — H30.22 INTERMEDIATE UVEITIS OF LEFT EYE: Primary | ICD-10-CM

## 2025-02-26 PROCEDURE — 99213 OFFICE O/P EST LOW 20 MIN: CPT | Performed by: OPHTHALMOLOGY

## 2025-02-26 PROCEDURE — 92134 CPTRZ OPH DX IMG PST SGM RTA: CPT | Mod: BILATERAL PROCEDURE | Performed by: OPHTHALMOLOGY

## 2025-02-26 ASSESSMENT — VISUAL ACUITY
METHOD: SNELLEN - LINEAR
OD_CC: 20/20
OS_CC: 20/20

## 2025-02-26 ASSESSMENT — SLIT LAMP EXAM - LIDS
COMMENTS: GOOD POSITION
COMMENTS: GOOD POSITION

## 2025-02-26 ASSESSMENT — TONOMETRY
IOP_METHOD: GOLDMANN APPLANATION
OS_IOP_MMHG: 14
OD_IOP_MMHG: 14

## 2025-02-26 ASSESSMENT — ENCOUNTER SYMPTOMS
NEUROLOGICAL NEGATIVE: 0
ENDOCRINE NEGATIVE: 0
CARDIOVASCULAR NEGATIVE: 0
ALLERGIC/IMMUNOLOGIC NEGATIVE: 0
MUSCULOSKELETAL NEGATIVE: 0
HEMATOLOGIC/LYMPHATIC NEGATIVE: 0
CONSTITUTIONAL NEGATIVE: 0
EYES NEGATIVE: 1
PSYCHIATRIC NEGATIVE: 0
RESPIRATORY NEGATIVE: 0
GASTROINTESTINAL NEGATIVE: 0

## 2025-02-26 ASSESSMENT — CUP TO DISC RATIO
OD_RATIO: .45
OS_RATIO: .45

## 2025-02-26 ASSESSMENT — EXTERNAL EXAM - LEFT EYE: OS_EXAM: NORMAL

## 2025-02-26 ASSESSMENT — EXTERNAL EXAM - RIGHT EYE: OD_EXAM: NORMAL

## 2025-02-26 NOTE — PROGRESS NOTES
Assessment/Plan   Diagnoses and all orders for this visit:  Intermediate uveitis of left eye  -     OCT, Retina - OU - Both Eyes  History of iritis        DIAGNOSTIC PROCEDURE DONE    OCT DONE OD/OS            REASON FOR TEST: will help address and tailor  therapy by detecting subclinical CME SRF     Hi quality OCT  scans obtained  signal good    OCT OD - Normal Foveal Contour, No Edema, IS/OS Junction Normal  OCT OS - Normal Foveal Contour, No Edema, IS/OS Junction Normal    additional commnents:     Impression       1 H20.00 Acute iritis of left eye-Improving  More angelique it was intermediate uveitis left          Discussion   Acute iritis for 2nd time in 3 years: left eye B27 pos   first episode in 2016, responded well to tx over 2-3 months last time around   B27      Off all drops; doing well   His iritis can be assymptomatic until CME develops: so he needs f/u uveitis at least Q 6m   RTC 6 m

## 2025-03-04 ENCOUNTER — TELEPHONE (OUTPATIENT)
Dept: ENDOCRINOLOGY | Facility: CLINIC | Age: 35
End: 2025-03-04
Payer: COMMERCIAL

## 2025-03-04 NOTE — TELEPHONE ENCOUNTER
Reason for call: Calling to speak with Denise PARKER  Notes: pt would like a call back to discuss bill

## 2025-03-08 DIAGNOSIS — F90.1 ATTENTION DEFICIT HYPERACTIVITY DISORDER (ADHD), PREDOMINANTLY HYPERACTIVE TYPE: ICD-10-CM

## 2025-03-11 RX ORDER — DEXTROAMPHETAMINE SACCHARATE, AMPHETAMINE ASPARTATE MONOHYDRATE, DEXTROAMPHETAMINE SULFATE AND AMPHETAMINE SULFATE 7.5; 7.5; 7.5; 7.5 MG/1; MG/1; MG/1; MG/1
30 CAPSULE, EXTENDED RELEASE ORAL
Qty: 30 CAPSULE | Refills: 0 | Status: SHIPPED | OUTPATIENT
Start: 2025-03-11 | End: 2025-04-13

## 2025-03-13 PROCEDURE — RXMED WILLOW AMBULATORY MEDICATION CHARGE

## 2025-03-14 ENCOUNTER — PHARMACY VISIT (OUTPATIENT)
Dept: PHARMACY | Facility: CLINIC | Age: 35
End: 2025-03-14
Payer: COMMERCIAL

## 2025-03-17 PROCEDURE — RXMED WILLOW AMBULATORY MEDICATION CHARGE

## 2025-03-18 ENCOUNTER — PHARMACY VISIT (OUTPATIENT)
Dept: PHARMACY | Facility: CLINIC | Age: 35
End: 2025-03-18
Payer: COMMERCIAL

## 2025-04-01 ENCOUNTER — PHARMACY VISIT (OUTPATIENT)
Dept: PHARMACY | Facility: CLINIC | Age: 35
End: 2025-04-01
Payer: COMMERCIAL

## 2025-04-01 PROCEDURE — RXMED WILLOW AMBULATORY MEDICATION CHARGE

## 2025-04-15 DIAGNOSIS — F90.1 ATTENTION DEFICIT HYPERACTIVITY DISORDER (ADHD), PREDOMINANTLY HYPERACTIVE TYPE: ICD-10-CM

## 2025-04-16 PROCEDURE — RXMED WILLOW AMBULATORY MEDICATION CHARGE

## 2025-04-16 RX ORDER — DEXTROAMPHETAMINE SACCHARATE, AMPHETAMINE ASPARTATE MONOHYDRATE, DEXTROAMPHETAMINE SULFATE AND AMPHETAMINE SULFATE 7.5; 7.5; 7.5; 7.5 MG/1; MG/1; MG/1; MG/1
30 CAPSULE, EXTENDED RELEASE ORAL
Qty: 30 CAPSULE | Refills: 0 | Status: SHIPPED | OUTPATIENT
Start: 2025-04-16 | End: 2025-05-17

## 2025-04-17 ENCOUNTER — PHARMACY VISIT (OUTPATIENT)
Dept: PHARMACY | Facility: CLINIC | Age: 35
End: 2025-04-17
Payer: COMMERCIAL

## 2025-04-21 ENCOUNTER — APPOINTMENT (OUTPATIENT)
Dept: OPHTHALMOLOGY | Facility: CLINIC | Age: 35
End: 2025-04-21
Payer: COMMERCIAL

## 2025-04-21 DIAGNOSIS — H30.22 INTERMEDIATE UVEITIS OF LEFT EYE: Primary | ICD-10-CM

## 2025-04-21 PROCEDURE — 99214 OFFICE O/P EST MOD 30 MIN: CPT | Performed by: OPHTHALMOLOGY

## 2025-04-21 PROCEDURE — 92134 CPTRZ OPH DX IMG PST SGM RTA: CPT | Mod: BILATERAL PROCEDURE | Performed by: OPHTHALMOLOGY

## 2025-04-21 ASSESSMENT — ENCOUNTER SYMPTOMS
CONSTITUTIONAL NEGATIVE: 0
NEUROLOGICAL NEGATIVE: 0
ENDOCRINE NEGATIVE: 0
MUSCULOSKELETAL NEGATIVE: 0
EYES NEGATIVE: 1
RESPIRATORY NEGATIVE: 0
PSYCHIATRIC NEGATIVE: 0
ALLERGIC/IMMUNOLOGIC NEGATIVE: 0
GASTROINTESTINAL NEGATIVE: 0
HEMATOLOGIC/LYMPHATIC NEGATIVE: 0
CARDIOVASCULAR NEGATIVE: 0

## 2025-04-21 ASSESSMENT — CUP TO DISC RATIO
OS_RATIO: .45
OD_RATIO: .45

## 2025-04-21 ASSESSMENT — VISUAL ACUITY
OS_CC: 20/25
METHOD: SNELLEN - LINEAR
OS_CC+: -2
OD_CC: 20/20
CORRECTION_TYPE: GLASSES

## 2025-04-21 ASSESSMENT — SLIT LAMP EXAM - LIDS
COMMENTS: GOOD POSITION
COMMENTS: GOOD POSITION

## 2025-04-21 ASSESSMENT — TONOMETRY
IOP_METHOD: GOLDMANN APPLANATION
OS_IOP_MMHG: 14

## 2025-04-21 ASSESSMENT — EXTERNAL EXAM - RIGHT EYE: OD_EXAM: NORMAL

## 2025-04-21 ASSESSMENT — EXTERNAL EXAM - LEFT EYE: OS_EXAM: NORMAL

## 2025-04-21 NOTE — PROGRESS NOTES
.Assessment/Plan   Diagnoses and all orders for this visit:  Intermediate uveitis of left eye  -     OCT, Retina - OU - Both Eyes        DIAGNOSTIC PROCEDURE DONE    OCT DONE OD/OS            REASON FOR TEST: will help address and tailor  therapy by detecting subclinical CME SRF     Hi quality OCT  scans obtained  signal good    OCT OD - Normal Foveal Contour, No Edema, IS/OS Junction Normal  OCT OS - Normal Foveal Contour, No Edema, IS/OS Junction Normal    additional commnents:     Impression       1 H20.00 Acute iritis of left eye-Improving  More angelique it was intermediate uveitis left          Discussion   Acute iritis for 3nd time in 3 years: left eye B27 pos   first episode in 2016, responded well to tx over 2-3 months last time around   B27    OS prednisolone 3/2/1/then every other day, decrease weekly    Avoid contacts while on pred      His iritis can be assymptomatic until CME develops: so he needs f/u uveitis at least Q 6m   RTC retina 1 month    Dr roy

## 2025-05-06 ENCOUNTER — PATIENT MESSAGE (OUTPATIENT)
Dept: PRIMARY CARE | Facility: CLINIC | Age: 35
End: 2025-05-06
Payer: COMMERCIAL

## 2025-05-10 DIAGNOSIS — F90.1 ATTENTION DEFICIT HYPERACTIVITY DISORDER (ADHD), PREDOMINANTLY HYPERACTIVE TYPE: ICD-10-CM

## 2025-05-12 RX ORDER — DEXTROAMPHETAMINE SACCHARATE, AMPHETAMINE ASPARTATE MONOHYDRATE, DEXTROAMPHETAMINE SULFATE AND AMPHETAMINE SULFATE 7.5; 7.5; 7.5; 7.5 MG/1; MG/1; MG/1; MG/1
30 CAPSULE, EXTENDED RELEASE ORAL
Qty: 30 CAPSULE | Refills: 0 | Status: SHIPPED | OUTPATIENT
Start: 2025-05-12 | End: 2025-06-14

## 2025-05-15 ENCOUNTER — PHARMACY VISIT (OUTPATIENT)
Dept: PHARMACY | Facility: CLINIC | Age: 35
End: 2025-05-15
Payer: COMMERCIAL

## 2025-05-15 PROCEDURE — RXMED WILLOW AMBULATORY MEDICATION CHARGE

## 2025-05-19 ENCOUNTER — APPOINTMENT (OUTPATIENT)
Dept: PRIMARY CARE | Facility: CLINIC | Age: 35
End: 2025-05-19
Payer: COMMERCIAL

## 2025-05-19 VITALS
RESPIRATION RATE: 16 BRPM | HEART RATE: 88 BPM | WEIGHT: 221 LBS | TEMPERATURE: 98 F | SYSTOLIC BLOOD PRESSURE: 120 MMHG | DIASTOLIC BLOOD PRESSURE: 80 MMHG | OXYGEN SATURATION: 97 % | HEIGHT: 71 IN | BODY MASS INDEX: 30.94 KG/M2

## 2025-05-19 DIAGNOSIS — M41.24 OTHER IDIOPATHIC SCOLIOSIS, THORACIC REGION: ICD-10-CM

## 2025-05-19 DIAGNOSIS — Z79.899 HIGH RISK MEDICATION USE: ICD-10-CM

## 2025-05-19 DIAGNOSIS — Z15.89 HLA B27 (HLA B27 POSITIVE): ICD-10-CM

## 2025-05-19 DIAGNOSIS — F90.1 ATTENTION DEFICIT HYPERACTIVITY DISORDER (ADHD), PREDOMINANTLY HYPERACTIVE TYPE: ICD-10-CM

## 2025-05-19 DIAGNOSIS — Z00.00 PHYSICAL EXAM, ANNUAL: Primary | ICD-10-CM

## 2025-05-19 DIAGNOSIS — Z86.69 HISTORY OF IRITIS: ICD-10-CM

## 2025-05-19 DIAGNOSIS — E55.9 VITAMIN D DEFICIENCY: ICD-10-CM

## 2025-05-19 LAB
APPEARANCE UR: CLEAR
BILIRUB UR QL STRIP: NEGATIVE
COLOR UR: YELLOW
GLUCOSE UR STRIP-MCNC: NEGATIVE MG/DL
HGB UR QL STRIP: NEGATIVE
KETONES UR STRIP-MCNC: NEGATIVE MG/DL
LEUKOCYTE ESTERASE UR QL STRIP: NEGATIVE
NITRITE UR QL STRIP: NEGATIVE
PH UR STRIP: 6 [PH]
PROT UR STRIP-MCNC: NEGATIVE MG/DL
SP GR UR STRIP.AUTO: 1.02
UROBILINOGEN UR STRIP-ACNC: 1 E.U./DL

## 2025-05-19 PROCEDURE — 99395 PREV VISIT EST AGE 18-39: CPT | Performed by: FAMILY MEDICINE

## 2025-05-19 PROCEDURE — 81003 URINALYSIS AUTO W/O SCOPE: CPT | Performed by: FAMILY MEDICINE

## 2025-05-19 PROCEDURE — 93000 ELECTROCARDIOGRAM COMPLETE: CPT | Performed by: FAMILY MEDICINE

## 2025-05-19 ASSESSMENT — PATIENT HEALTH QUESTIONNAIRE - PHQ9
SUM OF ALL RESPONSES TO PHQ9 QUESTIONS 1 AND 2: 0
1. LITTLE INTEREST OR PLEASURE IN DOING THINGS: NOT AT ALL
2. FEELING DOWN, DEPRESSED OR HOPELESS: NOT AT ALL

## 2025-05-19 ASSESSMENT — PAIN SCALES - GENERAL: PAINLEVEL_OUTOF10: 0-NO PAIN

## 2025-05-19 NOTE — PROGRESS NOTES
Subjective   Raghu Khan is a 34 y.o. male who presents for Follow-up and Annual Exam (Patient here for annual physical exam today).    HPI  : Patient is a 34-year-old male who is in for his annual physical exam.  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  He has a history of ADHD since college and does take Adderall XR 30 mg every 24 hours.  He also has chronic insomnia and has been on trazodone 50 mg at night which does seem to help.  He will have an ECG today, give us a urine specimen and also have complete blood work.  He will sign the E consent form for his Adderall, and I will review his OARRS report.  He is traveling this weekend to the [place] for his brother-in-law's wedding with his wife.  Patient also has a history of HLA B27 positive condition   and he recently had another episode of iritis and did follow-up with his ophthalmologist.  He also has a history of factor V Leiden mutation but that is stable.    Objective   : ROS : 10 systems were reviewed and the information is included in the HPI and no additional review of systems is indicated.    Physical Exam  Vitals and nursing note reviewed.   Constitutional:       Appearance: Normal appearance.      Comments: Patient is alert and oriented x3.   No acute distress and does try to watch his diet.   HENT:      Head: Normocephalic.      Right Ear: Tympanic membrane and external ear normal.      Left Ear: Tympanic membrane and external ear normal.      Ears:      Comments: Ears are patent bilaterally and TMs are clear.     Nose: Nose normal.      Mouth/Throat:      Mouth: Mucous membranes are moist.      Pharynx: Oropharynx is clear.      Comments: Mouth is moist, tongue is midline.  No posterior pharyngeal erythema.  Eyes:      Extraocular Movements: Extraocular movements intact.      Conjunctiva/sclera: Conjunctivae normal.      Pupils: Pupils are equal, round, and reactive to light.      Comments: History of  Iritis and had steroid eye drops  recently.   Follows with Ophthalmology.    Neck:      Comments: No carotid bruits, no thyromegaly, no cervical adenopathy.  Occasional neck spasm and restriction of motion secondary to stress and tension.  Cardiovascular:      Rate and Rhythm: Normal rate and regular rhythm.      Pulses: Normal pulses.      Heart sounds: Normal heart sounds.      Comments: Patient denies chest pain and no palpitations.  Heart rhythm is stable S1 and S2 are noted, no ectopics.  Pulmonary:      Effort: Pulmonary effort is normal.      Breath sounds: Normal breath sounds.      Comments: Patient denies any coughing or wheezing.  Lungs are clear to auscultation.    Abdominal:      General: Bowel sounds are normal.      Palpations: Abdomen is soft.      Comments: Abdomen is soft and non tender.  No flank tenderness.  No suprapubic pain.  Positive bowel sounds x4.  No abdominal guarding and no rebound tenderness.   Genitourinary:     Comments: Patient denies dysuria, no hematuria, no nocturia, denies flank pain.  Patient has his wife have been evaluated by infertility clinic and are still working towards having a child.  Musculoskeletal:         General: Normal range of motion.      Cervical back: Normal range of motion and neck supple.      Comments:  Mild restriction of motion cervical and lumbar spines due to muscle spasm.  Mild asymptomatic rotoscoliosis.  Patient is HLA-B27 positive but denies any current low back problems.   Skin:     General: Skin is warm.      Comments: There is no bruising, no erythema, no skin lesions noted, no rashes.   Neurological:      General: No focal deficit present.      Mental Status: He is alert and oriented to person, place, and time. Mental status is at baseline.      Comments: No focal neurosensory deficits are noted. Taking medication for  ADHD  . Patient denies any peripheral neuropathy.  Coordination and gait are stable.  Normal muscle strength upper and lower  extremities.  Patient has done very well with his Adderall for several years.  Focus and concentration are excellent.   Psychiatric:         Mood and Affect: Mood normal.         Behavior: Behavior normal.         Thought Content: Thought content normal.         Judgment: Judgment normal.      Comments: Patient has normal mood and affect.  Thought content and judgment are stable. Behavior is normal.     PLAN : Patient is a 34-year-old male who was evaluated today for his annual physical exam.  His ECG shows sinus arrhythmia rate of 68 bpm, normal variant ECG, no acute ST-T wave changes.  Patient will be notified of his blood results in 3 days and further recommendations will be made at that time.  He also signed the send form and we did do a urine for drug screen.  I also review his OARRS report before giving his prescriptions for Adderall.  He has been very stable on this medication since college.  He also takes trazodone at bedtime.  Physical exam today is stable but he does have to be cautious with his iritis and his positive HLA-B27.  He also is heterozygous for Leiden factor V.  Patient will follow-up as needed pending all his blood work results.    Problem List Items Addressed This Visit    None           Paco Parrish, DO

## 2025-05-20 LAB
25(OH)D3+25(OH)D2 SERPL-MCNC: 72 NG/ML (ref 30–100)
ALBUMIN SERPL-MCNC: 4.8 G/DL (ref 3.6–5.1)
ALP SERPL-CCNC: 70 U/L (ref 36–130)
ALT SERPL-CCNC: 17 U/L (ref 9–46)
AMPHETAMINES UR QL: NORMAL NG/ML
ANION GAP SERPL CALCULATED.4IONS-SCNC: 8 MMOL/L (CALC) (ref 7–17)
AST SERPL-CCNC: 20 U/L (ref 10–40)
BARBITURATES UR QL: NORMAL NG/ML
BENZODIAZ UR QL: NORMAL NG/ML
BILIRUB SERPL-MCNC: 0.7 MG/DL (ref 0.2–1.2)
BUN SERPL-MCNC: 18 MG/DL (ref 7–25)
BZE UR QL: NORMAL NG/ML
CALCIUM SERPL-MCNC: 9.6 MG/DL (ref 8.6–10.3)
CHLORIDE SERPL-SCNC: 100 MMOL/L (ref 98–110)
CHOLEST SERPL-MCNC: 167 MG/DL
CHOLEST/HDLC SERPL: 3.7 (CALC)
CO2 SERPL-SCNC: 30 MMOL/L (ref 20–32)
CREAT SERPL-MCNC: 0.97 MG/DL (ref 0.6–1.26)
CREAT UR-MCNC: NORMAL MG/DL
EGFRCR SERPLBLD CKD-EPI 2021: 106 ML/MIN/1.73M2
ERYTHROCYTE [DISTWIDTH] IN BLOOD BY AUTOMATED COUNT: 12.6 % (ref 11–15)
FENTANYL UR QL SCN: NORMAL NG/ML
GLUCOSE SERPL-MCNC: 101 MG/DL (ref 65–99)
HCT VFR BLD AUTO: 45.7 % (ref 38.5–50)
HDLC SERPL-MCNC: 45 MG/DL
HGB BLD-MCNC: 15.2 G/DL (ref 13.2–17.1)
LDLC SERPL CALC-MCNC: 107 MG/DL (CALC)
MCH RBC QN AUTO: 31 PG (ref 27–33)
MCHC RBC AUTO-ENTMCNC: 33.3 G/DL (ref 32–36)
MCV RBC AUTO: 93.1 FL (ref 80–100)
METHADONE UR QL: NORMAL NG/ML
NONHDLC SERPL-MCNC: 122 MG/DL (CALC)
OPIATES UR QL: NORMAL NG/ML
OXYCODONE UR QL: NORMAL NG/ML
PCP UR QL: NORMAL NG/ML
PLATELET # BLD AUTO: 210 THOUSAND/UL (ref 140–400)
PMV BLD REES-ECKER: 10.6 FL (ref 7.5–12.5)
POTASSIUM SERPL-SCNC: 4 MMOL/L (ref 3.5–5.3)
PROT SERPL-MCNC: 7.5 G/DL (ref 6.1–8.1)
QUEST NOTES AND COMMENTS: NORMAL
RBC # BLD AUTO: 4.91 MILLION/UL (ref 4.2–5.8)
SODIUM SERPL-SCNC: 138 MMOL/L (ref 135–146)
THC UR QL: NORMAL NG/ML
TRIGL SERPL-MCNC: 66 MG/DL
TSH SERPL-ACNC: 1.56 MIU/L (ref 0.4–4.5)
WBC # BLD AUTO: 8.7 THOUSAND/UL (ref 3.8–10.8)

## 2025-05-22 NOTE — RESULT ENCOUNTER NOTE
Blood sugar,   kidney and liver function are normal       cholesterol is normal at 167   triglycerides are normal at 66      vitamin D is good at 72        red and white blood cell counts are normal          platelet count is normal   thyroid function is normal     urine was normal          stay on the same medication and keep up the good work.

## 2025-06-02 ENCOUNTER — APPOINTMENT (OUTPATIENT)
Dept: OPHTHALMOLOGY | Facility: CLINIC | Age: 35
End: 2025-06-02
Payer: COMMERCIAL

## 2025-06-04 ENCOUNTER — APPOINTMENT (OUTPATIENT)
Dept: OPHTHALMOLOGY | Facility: CLINIC | Age: 35
End: 2025-06-04
Payer: COMMERCIAL

## 2025-06-04 DIAGNOSIS — Z31.41 FERTILITY TESTING: ICD-10-CM

## 2025-06-10 DIAGNOSIS — Z31.41 FERTILITY TESTING: ICD-10-CM

## 2025-06-11 ENCOUNTER — TELEPHONE (OUTPATIENT)
Dept: ENDOCRINOLOGY | Facility: CLINIC | Age: 35
End: 2025-06-11

## 2025-06-11 ENCOUNTER — APPOINTMENT (OUTPATIENT)
Dept: OPHTHALMOLOGY | Facility: CLINIC | Age: 35
End: 2025-06-11
Payer: COMMERCIAL

## 2025-06-11 DIAGNOSIS — Z15.89 HLA B27 (HLA B27 POSITIVE): ICD-10-CM

## 2025-06-11 DIAGNOSIS — H30.22 INTERMEDIATE UVEITIS OF LEFT EYE: Primary | ICD-10-CM

## 2025-06-11 DIAGNOSIS — D68.51 FACTOR 5 LEIDEN MUTATION, HETEROZYGOUS (MULTI): ICD-10-CM

## 2025-06-11 PROCEDURE — 99213 OFFICE O/P EST LOW 20 MIN: CPT | Performed by: OPHTHALMOLOGY

## 2025-06-11 ASSESSMENT — ENCOUNTER SYMPTOMS
EYES NEGATIVE: 1
ALLERGIC/IMMUNOLOGIC NEGATIVE: 0
RESPIRATORY NEGATIVE: 0
PSYCHIATRIC NEGATIVE: 0
NEUROLOGICAL NEGATIVE: 0
CARDIOVASCULAR NEGATIVE: 0
HEMATOLOGIC/LYMPHATIC NEGATIVE: 0
CONSTITUTIONAL NEGATIVE: 0
ENDOCRINE NEGATIVE: 0
GASTROINTESTINAL NEGATIVE: 0
MUSCULOSKELETAL NEGATIVE: 0

## 2025-06-11 ASSESSMENT — VISUAL ACUITY
OD_CC: 20/20
OS_CC: 20/20
METHOD: SNELLEN - LINEAR

## 2025-06-11 ASSESSMENT — TONOMETRY
IOP_METHOD: GOLDMANN APPLANATION
OS_IOP_MMHG: 16
OD_IOP_MMHG: 17

## 2025-06-11 ASSESSMENT — EXTERNAL EXAM - RIGHT EYE: OD_EXAM: NORMAL

## 2025-06-11 ASSESSMENT — EXTERNAL EXAM - LEFT EYE: OS_EXAM: NORMAL

## 2025-06-11 ASSESSMENT — CUP TO DISC RATIO
OD_RATIO: .45
OS_RATIO: .45

## 2025-06-11 ASSESSMENT — SLIT LAMP EXAM - LIDS
COMMENTS: GOOD POSITION
COMMENTS: GOOD POSITION

## 2025-06-11 NOTE — PROGRESS NOTES
.Assessment/Plan   Diagnoses and all orders for this visit:  Intermediate uveitis of left eye  Factor 5 Leiden mutation, heterozygous (Multi)  HLA B27 (HLA B27 positive)      Hi quality OCT  scans obtained 06/11/25  signal good    OCT OD - hyaloid attached, Normal Foveal Contour, No Edema, IS/OS Junction Normal  OCT OS -  hyaloid attached, Normal Foveal Contour, No Edema, IS/OS Junction Normal    additional commnents: no cystoid macular edema (CME) OU    Impression  Off all drops for 1 month (/2025)  Today No evidence of active inflammation  RTC 6 months    Prior Discussion   1 H20.00 Acute iritis of left eye-Improving  More likely it was intermediate uveitis left   Acute iritis for 3nd time in 3 years: left eye B27 pos   first episode in 2016, responded well to tx over 2-3 months last time around   B27  Avoid contacts while on pred    His iritis can be assymptomatic until CME develops: so he needs f/u uveitis at least Q6months

## 2025-06-14 DIAGNOSIS — F90.1 ATTENTION DEFICIT HYPERACTIVITY DISORDER (ADHD), PREDOMINANTLY HYPERACTIVE TYPE: ICD-10-CM

## 2025-06-16 ENCOUNTER — ANCILLARY PROCEDURE (OUTPATIENT)
Dept: ENDOCRINOLOGY | Facility: CLINIC | Age: 35
End: 2025-06-16
Payer: COMMERCIAL

## 2025-06-16 ENCOUNTER — PHARMACY VISIT (OUTPATIENT)
Dept: PHARMACY | Facility: CLINIC | Age: 35
End: 2025-06-16
Payer: COMMERCIAL

## 2025-06-16 DIAGNOSIS — Z31.41 FERTILITY TESTING: ICD-10-CM

## 2025-06-16 LAB
# OF VIALS: 2
% EX RESIDUAL CYTOPLASM (SEMEN): 0 %
% HEAD DEFECTS (SEMEN): 97.8 %
% NECK MIDPIECE (SEMEN): 13.3 %
% NORMAL (SEMEN): 2.3 % (ref 4–?)
% TAIL DEFECTS (SEMEN): 6.5 %
ABSTINENCE (DAYS): 4 DAYS (ref 2–7)
AGGLUTINATION (SEMEN): NO
ANALYZED TIME:: ABNORMAL
ANDROLOGY LAB ID#: ABNORMAL
CLUMPS (SEMEN): YES
COLLECTED COMPLETELY: YES
COLLECTION LOCATION:: ABNORMAL
COLLECTION METHOD:: ABNORMAL
CONCENTRATION CN (POST-WASH): 95.37 MILL/ML
CONCENTRATION(SEMEN): 190.95 MILL/ML (ref 15–?)
DEBRIS (SEMEN): YES
NON PROG. MOTILITY (SEMEN): 2 %
NON PROG. MOTILITY CN (POST-WASH): 1 %
PROG. MOTILITY (SEMEN): 32 % (ref 32–?)
PROG. MOTILITY CN (POST-WASH): 36 %
RECEIVED TIME:: ABNORMAL
REI PARTNER DOB: ABNORMAL
REI PARTNER NAME: ABNORMAL
SEMEN APPEARANCE: NORMAL
SEMEN LIQUEFACTION: NORMAL
SEMEN VISCOSITY: ABNORMAL
SPECIMEN ID REI: ABNORMAL
TOT. NO OF NORM. MOTILE SPERM (SEMEN): 4.55 MILL
TOT. NO OF NORM. SPERM (SEMEN): 13.32 MILL
TOTAL MOTILE SPERM PER VIAL (POST-THAW): 40.58 MILL/VIAL
TOTAL MOTILITY (SEMEN): 34 % (ref 40–?)
TOTAL MOTILITY CN (POST-WASH): 37 %
TOTAL NO OF MOTILE (SEMEN): 202.21 MILL
TOTAL NO OF MOTILE CN (POST-WASH): 141.56 MILL
TOTAL NO OF SPERM (SEMEN): 591.95 MILL (ref 39–?)
TOTAL NO OF SPERM CN (POST-WASH): 381.46 MILL
UNIT VOLUME: ABNORMAL
VOLUME (SEMEN): 3.1 ML (ref 1.5–?)
VOLUME CN (POST-WASH): 4 ML

## 2025-06-16 PROCEDURE — RXMED WILLOW AMBULATORY MEDICATION CHARGE

## 2025-06-16 PROCEDURE — 89259 CRYOPRESERVATION SPERM: CPT | Performed by: OBSTETRICS & GYNECOLOGY

## 2025-06-16 PROCEDURE — 89322 SEMEN ANAL STRICT CRITERIA: CPT | Performed by: OBSTETRICS & GYNECOLOGY

## 2025-06-16 RX ORDER — DEXTROAMPHETAMINE SACCHARATE, AMPHETAMINE ASPARTATE MONOHYDRATE, DEXTROAMPHETAMINE SULFATE AND AMPHETAMINE SULFATE 7.5; 7.5; 7.5; 7.5 MG/1; MG/1; MG/1; MG/1
30 CAPSULE, EXTENDED RELEASE ORAL
Qty: 30 CAPSULE | Refills: 0 | Status: SHIPPED | OUTPATIENT
Start: 2025-06-16 | End: 2025-07-16

## 2025-06-17 ENCOUNTER — PHARMACY VISIT (OUTPATIENT)
Dept: PHARMACY | Facility: CLINIC | Age: 35
End: 2025-06-17
Payer: COMMERCIAL

## 2025-07-05 ENCOUNTER — ANCILLARY PROCEDURE (OUTPATIENT)
Dept: ENDOCRINOLOGY | Facility: CLINIC | Age: 35
End: 2025-07-05

## 2025-07-05 DIAGNOSIS — Z31.41 FERTILITY TESTING: ICD-10-CM

## 2025-07-05 LAB
ABSTINENCE (DAYS): 3 DAYS (ref 2–7)
AGGLUTINATION (SEMEN): NO
ANALYZED TIME:: NORMAL
ANDROLOGY LAB ID#: NORMAL
CLUMPS (SEMEN): YES
COLLECTED COMPLETELY: YES
COLLECTION LOCATION:: NORMAL
COLLECTION METHOD:: NORMAL
CONCENTRATION CN (POST-WASH): 18.52 MILL/ML
CONCENTRATION(SEMEN): 74.07 MILL/ML (ref 15–?)
DEBRIS (SEMEN): YES
NON PROG. MOTILITY (SEMEN): 6 %
NON PROG. MOTILITY CN (POST-WASH): 6 %
PROG. MOTILITY (SEMEN): 53 % (ref 32–?)
PROG. MOTILITY CN (POST-WASH): 89 %
RECEIVED TIME:: NORMAL
REI PARTNER DOB: NORMAL
REI PARTNER NAME: NORMAL
SEMEN APPEARANCE: NORMAL
SEMEN LIQUEFACTION: NORMAL
SEMEN VISCOSITY: NORMAL
SPERM PROCESS METHOD: NORMAL
TOTAL MOTILITY (SEMEN): 59 % (ref 40–?)
TOTAL MOTILITY CN (POST-WASH): 95 %
TOTAL NO OF MOTILE (SEMEN): 153.61 MILL
TOTAL NO OF MOTILE CN (POST-WASH): 8.75 MILL
TOTAL NO OF SPERM (SEMEN): 259.26 MILL (ref 39–?)
TOTAL NO OF SPERM CN (POST-WASH): 9.26 MILL
VOLUME (SEMEN): 3.5 ML (ref 1.5–?)
VOLUME CN (POST-WASH): 0.5 ML

## 2025-07-08 DIAGNOSIS — Z31.41 FERTILITY TESTING: ICD-10-CM

## 2025-07-15 DIAGNOSIS — F90.1 ATTENTION DEFICIT HYPERACTIVITY DISORDER (ADHD), PREDOMINANTLY HYPERACTIVE TYPE: ICD-10-CM

## 2025-07-15 RX ORDER — DEXTROAMPHETAMINE SACCHARATE, AMPHETAMINE ASPARTATE MONOHYDRATE, DEXTROAMPHETAMINE SULFATE AND AMPHETAMINE SULFATE 7.5; 7.5; 7.5; 7.5 MG/1; MG/1; MG/1; MG/1
30 CAPSULE, EXTENDED RELEASE ORAL
Qty: 30 CAPSULE | Refills: 0 | Status: CANCELLED | OUTPATIENT
Start: 2025-07-15 | End: 2025-08-14

## 2025-07-16 DIAGNOSIS — F90.1 ATTENTION DEFICIT HYPERACTIVITY DISORDER (ADHD), PREDOMINANTLY HYPERACTIVE TYPE: ICD-10-CM

## 2025-07-16 PROCEDURE — RXMED WILLOW AMBULATORY MEDICATION CHARGE

## 2025-07-16 RX ORDER — DEXTROAMPHETAMINE SACCHARATE, AMPHETAMINE ASPARTATE MONOHYDRATE, DEXTROAMPHETAMINE SULFATE AND AMPHETAMINE SULFATE 7.5; 7.5; 7.5; 7.5 MG/1; MG/1; MG/1; MG/1
30 CAPSULE, EXTENDED RELEASE ORAL
Qty: 30 CAPSULE | Refills: 0 | Status: SHIPPED | OUTPATIENT
Start: 2025-07-16 | End: 2025-08-16

## 2025-07-22 ENCOUNTER — PHARMACY VISIT (OUTPATIENT)
Dept: PHARMACY | Facility: CLINIC | Age: 35
End: 2025-07-22
Payer: COMMERCIAL

## 2025-08-15 ENCOUNTER — ANCILLARY PROCEDURE (OUTPATIENT)
Dept: ENDOCRINOLOGY | Facility: CLINIC | Age: 35
End: 2025-08-15

## 2025-08-15 DIAGNOSIS — Z31.41 FERTILITY TESTING: ICD-10-CM

## 2025-08-15 LAB
ABSTINENCE (DAYS): 2 DAYS (ref 2–7)
AGGLUTINATION (SEMEN): YES
ANALYZED TIME:: NORMAL
ANDROLOGY LAB ID#: NORMAL
CLUMPS (SEMEN): YES
COLLECTED COMPLETELY: YES
COLLECTION LOCATION:: NORMAL
COLLECTION METHOD:: NORMAL
CONCENTRATION CN (POST-WASH): 63.91 MILL/ML
CONCENTRATION(SEMEN): 136 MILL/ML (ref 15–?)
DEBRIS (SEMEN): YES
DEGREE (SEMEN): NORMAL
LEUKOCYTE (SEMEN): NEGATIVE
NON PROG. MOTILITY (SEMEN): 2 %
NON PROG. MOTILITY CN (POST-WASH): 2 %
PATTERN (SEMEN): NORMAL
PROG. MOTILITY (SEMEN): 73 % (ref 32–?)
PROG. MOTILITY CN (POST-WASH): 88 %
RECEIVED TIME:: NORMAL
REI PARTNER DOB: NORMAL
REI PARTNER NAME: NORMAL
SEMEN APPEARANCE: NORMAL
SEMEN LIQUEFACTION: NORMAL
SEMEN VISCOSITY: NORMAL
SPERM PROCESS METHOD: NORMAL
TOTAL MOTILITY (SEMEN): 75 % (ref 40–?)
TOTAL MOTILITY CN (POST-WASH): 91 %
TOTAL NO OF MOTILE (SEMEN): 234.72 MILL
TOTAL NO OF MOTILE CN (POST-WASH): 28.84 MILL
TOTAL NO OF RND CELLS (SEMEN): 2 MILL (ref ?–5)
TOTAL NO OF SPERM (SEMEN): 312.8 MILL (ref 39–?)
TOTAL NO OF SPERM CN (POST-WASH): 31.95 MILL
VOLUME (SEMEN): 2.3 ML (ref 1.5–?)
VOLUME CN (POST-WASH): 0.5 ML

## 2025-08-19 DIAGNOSIS — F90.1 ATTENTION DEFICIT HYPERACTIVITY DISORDER (ADHD), PREDOMINANTLY HYPERACTIVE TYPE: ICD-10-CM

## 2025-08-19 RX ORDER — DEXTROAMPHETAMINE SACCHARATE, AMPHETAMINE ASPARTATE MONOHYDRATE, DEXTROAMPHETAMINE SULFATE AND AMPHETAMINE SULFATE 7.5; 7.5; 7.5; 7.5 MG/1; MG/1; MG/1; MG/1
30 CAPSULE, EXTENDED RELEASE ORAL
Qty: 30 CAPSULE | Refills: 0 | Status: SHIPPED | OUTPATIENT
Start: 2025-08-19 | End: 2025-09-20

## 2025-08-20 PROCEDURE — RXMED WILLOW AMBULATORY MEDICATION CHARGE

## 2025-08-21 ENCOUNTER — PHARMACY VISIT (OUTPATIENT)
Dept: PHARMACY | Facility: CLINIC | Age: 35
End: 2025-08-21
Payer: COMMERCIAL

## 2025-09-03 ENCOUNTER — APPOINTMENT (OUTPATIENT)
Dept: OPHTHALMOLOGY | Facility: CLINIC | Age: 35
End: 2025-09-03
Payer: COMMERCIAL

## 2025-11-03 ENCOUNTER — APPOINTMENT (OUTPATIENT)
Dept: PRIMARY CARE | Facility: CLINIC | Age: 35
End: 2025-11-03
Payer: COMMERCIAL

## 2025-12-15 ENCOUNTER — APPOINTMENT (OUTPATIENT)
Dept: OPHTHALMOLOGY | Facility: CLINIC | Age: 35
End: 2025-12-15
Payer: COMMERCIAL